# Patient Record
Sex: FEMALE | Race: WHITE | Employment: OTHER | URBAN - NONMETROPOLITAN AREA
[De-identification: names, ages, dates, MRNs, and addresses within clinical notes are randomized per-mention and may not be internally consistent; named-entity substitution may affect disease eponyms.]

---

## 2018-12-05 ENCOUNTER — DOCTOR'S OFFICE (OUTPATIENT)
Dept: URBAN - NONMETROPOLITAN AREA CLINIC 1 | Facility: CLINIC | Age: 83
Setting detail: OPHTHALMOLOGY
End: 2018-12-05
Payer: COMMERCIAL

## 2018-12-05 DIAGNOSIS — H25.043: ICD-10-CM

## 2018-12-05 DIAGNOSIS — H40.1432: ICD-10-CM

## 2018-12-05 DIAGNOSIS — H25.013: ICD-10-CM

## 2018-12-05 DIAGNOSIS — G24.5: ICD-10-CM

## 2018-12-05 DIAGNOSIS — H35.3134: ICD-10-CM

## 2018-12-05 DIAGNOSIS — H25.89: ICD-10-CM

## 2018-12-05 DIAGNOSIS — H02.432: ICD-10-CM

## 2018-12-05 PROCEDURE — 92134 CPTRZ OPH DX IMG PST SGM RTA: CPT | Performed by: OPHTHALMOLOGY

## 2018-12-05 PROCEDURE — 76514 ECHO EXAM OF EYE THICKNESS: CPT | Performed by: OPHTHALMOLOGY

## 2018-12-05 PROCEDURE — 99204 OFFICE O/P NEW MOD 45 MIN: CPT | Performed by: OPHTHALMOLOGY

## 2018-12-05 ASSESSMENT — REFRACTION_MANIFEST
OU_VA: 20/
OD_VA1: 20/
OS_VA1: 20/
OD_VA2: 20/
OD_VA1: 20/
OS_VA2: 20/
OS_VA3: 20/
OD_VA3: 20/
OS_VA3: 20/
OS_VA2: 20/
OS_VA1: 20/
OD_VA3: 20/
OD_VA2: 20/
OU_VA: 20/

## 2018-12-05 ASSESSMENT — REFRACTION_CURRENTRX
OD_CYLINDER: -0.75
OD_AXIS: 128
OS_ADD: +2.75
OS_OVR_VA: 20/
OS_CYLINDER: -1.75
OD_ADD: +2.75
OD_OVR_VA: 20/
OS_OVR_VA: 20/
OS_AXIS: 068
OD_VPRISM_DIRECTION: BF
OD_OVR_VA: 20/
OD_SPHERE: +3.25
OS_SPHERE: +3.25
OS_OVR_VA: 20/
OD_OVR_VA: 20/

## 2018-12-05 ASSESSMENT — VISUAL ACUITY
OD_BCVA: 20/70-1
OS_BCVA: 20/70-2

## 2018-12-05 ASSESSMENT — LID POSITION - PTOSIS: OS_PTOSIS: LUL 1+

## 2018-12-05 ASSESSMENT — CONFRONTATIONAL VISUAL FIELD TEST (CVF)
OD_FINDINGS: FULL
OS_FINDINGS: FULL

## 2019-01-08 ENCOUNTER — DOCTOR'S OFFICE (OUTPATIENT)
Dept: URBAN - NONMETROPOLITAN AREA CLINIC 1 | Facility: CLINIC | Age: 84
Setting detail: OPHTHALMOLOGY
End: 2019-01-08
Payer: COMMERCIAL

## 2019-01-08 DIAGNOSIS — H25.041: ICD-10-CM

## 2019-01-08 PROCEDURE — 92136 OPHTHALMIC BIOMETRY: CPT | Performed by: OPHTHALMOLOGY

## 2019-01-17 ENCOUNTER — AMBUL SURGICAL CARE (OUTPATIENT)
Dept: URBAN - NONMETROPOLITAN AREA SURGERY 1 | Facility: SURGERY | Age: 84
Setting detail: OPHTHALMOLOGY
End: 2019-01-17
Payer: COMMERCIAL

## 2019-01-17 DIAGNOSIS — H25.011: ICD-10-CM

## 2019-01-17 DIAGNOSIS — H25.041: ICD-10-CM

## 2019-01-17 PROCEDURE — G8907 PT DOC NO EVENTS ON DISCHARG: HCPCS | Performed by: OPHTHALMOLOGY

## 2019-01-17 PROCEDURE — G8918 PT W/O PREOP ORDER IV AB PRO: HCPCS | Performed by: OPHTHALMOLOGY

## 2019-01-17 PROCEDURE — 66982 XCAPSL CTRC RMVL CPLX WO ECP: CPT | Performed by: OPHTHALMOLOGY

## 2019-01-18 ENCOUNTER — OPTICAL OFFICE (OUTPATIENT)
Dept: URBAN - NONMETROPOLITAN AREA CLINIC 4 | Facility: CLINIC | Age: 84
Setting detail: OPHTHALMOLOGY
End: 2019-01-18

## 2019-01-18 ENCOUNTER — RX ONLY (RX ONLY)
Age: 84
End: 2019-01-18

## 2019-01-18 ENCOUNTER — DOCTOR'S OFFICE (OUTPATIENT)
Dept: URBAN - NONMETROPOLITAN AREA CLINIC 1 | Facility: CLINIC | Age: 84
Setting detail: OPHTHALMOLOGY
End: 2019-01-18
Payer: COMMERCIAL

## 2019-01-18 DIAGNOSIS — Z96.1: ICD-10-CM

## 2019-01-18 DIAGNOSIS — H25.042: ICD-10-CM

## 2019-01-18 DIAGNOSIS — H52.7: ICD-10-CM

## 2019-01-18 DIAGNOSIS — H25.012: ICD-10-CM

## 2019-01-18 DIAGNOSIS — H25.89: ICD-10-CM

## 2019-01-18 PROCEDURE — 92136 OPHTHALMIC BIOMETRY: CPT | Performed by: OPHTHALMOLOGY

## 2019-01-18 PROCEDURE — 99024 POSTOP FOLLOW-UP VISIT: CPT | Performed by: PHYSICIAN ASSISTANT

## 2019-01-18 PROCEDURE — V2100 LENS SPHER SINGLE PLANO 4.00: HCPCS | Performed by: OPHTHALMOLOGY

## 2019-01-18 ASSESSMENT — LID POSITION - PTOSIS: OS_PTOSIS: LUL 1+

## 2019-01-21 ASSESSMENT — REFRACTION_CURRENTRX
OD_VPRISM_DIRECTION: BF
OS_SPHERE: +3.25
OD_CYLINDER: -0.75
OD_SPHERE: +3.25
OS_OVR_VA: 20/
OS_ADD: +2.75
OD_ADD: +2.75
OS_CYLINDER: -1.75
OD_OVR_VA: 20/
OD_OVR_VA: 20/
OS_OVR_VA: 20/
OS_AXIS: 068
OD_OVR_VA: 20/
OS_OVR_VA: 20/
OD_AXIS: 128

## 2019-01-21 ASSESSMENT — REFRACTION_MANIFEST
OD_VA3: 20/
OD_VA3: 20/
OD_VA2: 20/
OS_VA2: 20/
OS_VA3: 20/
OS_VA1: 20/
OD_VA2: 20/
OU_VA: 20/
OD_VA1: 20/
OS_VA3: 20/
OD_VA1: 20/
OU_VA: 20/
OS_VA2: 20/
OS_VA1: 20/

## 2019-01-21 ASSESSMENT — VISUAL ACUITY
OD_BCVA: 20/70-1
OS_BCVA: 20/70+2

## 2019-01-21 ASSESSMENT — REFRACTION_AUTOREFRACTION
OD_AXIS: 077
OD_SPHERE: +1.75
OD_CYLINDER: -0.75

## 2019-01-21 ASSESSMENT — SPHEQUIV_DERIVED: OD_SPHEQUIV: 1.375

## 2019-01-25 ENCOUNTER — DOCTOR'S OFFICE (OUTPATIENT)
Dept: URBAN - NONMETROPOLITAN AREA CLINIC 1 | Facility: CLINIC | Age: 84
Setting detail: OPHTHALMOLOGY
End: 2019-01-25
Payer: COMMERCIAL

## 2019-01-25 DIAGNOSIS — H25.042: ICD-10-CM

## 2019-01-25 DIAGNOSIS — H25.012: ICD-10-CM

## 2019-01-25 DIAGNOSIS — H25.89: ICD-10-CM

## 2019-01-25 DIAGNOSIS — Z96.1: ICD-10-CM

## 2019-01-25 PROCEDURE — 99024 POSTOP FOLLOW-UP VISIT: CPT | Performed by: OPHTHALMOLOGY

## 2019-01-25 ASSESSMENT — REFRACTION_CURRENTRX
OD_ADD: +2.75
OD_OVR_VA: 20/
OS_SPHERE: +3.25
OS_OVR_VA: 20/
OD_SPHERE: +3.25
OD_VPRISM_DIRECTION: BF
OS_OVR_VA: 20/
OS_AXIS: 068
OS_ADD: +2.75
OD_AXIS: 128
OD_OVR_VA: 20/
OS_OVR_VA: 20/
OS_CYLINDER: -1.75
OD_OVR_VA: 20/
OD_CYLINDER: -0.75

## 2019-01-25 ASSESSMENT — REFRACTION_MANIFEST
OD_VA1: 20/
OU_VA: 20/
OS_VA2: 20/
OS_VA1: 20/
OS_VA1: 20/
OS_VA2: 20/
OU_VA: 20/
OD_VA2: 20/
OD_VA2: 20/
OS_VA3: 20/
OD_VA3: 20/
OD_VA1: 20/
OD_VA3: 20/
OS_VA3: 20/

## 2019-01-25 ASSESSMENT — REFRACTION_AUTOREFRACTION
OD_SPHERE: +1.00
OD_CYLINDER: -1.00
OD_AXIS: 102

## 2019-01-25 ASSESSMENT — SPHEQUIV_DERIVED: OD_SPHEQUIV: 0.5

## 2019-01-25 ASSESSMENT — LID POSITION - PTOSIS: OS_PTOSIS: LUL 1+

## 2019-01-25 ASSESSMENT — VISUAL ACUITY
OS_BCVA: 20/25-2
OD_BCVA: 20/70-1

## 2019-02-07 ENCOUNTER — AMBUL SURGICAL CARE (OUTPATIENT)
Dept: URBAN - NONMETROPOLITAN AREA SURGERY 1 | Facility: SURGERY | Age: 84
Setting detail: OPHTHALMOLOGY
End: 2019-02-07
Payer: COMMERCIAL

## 2019-02-07 DIAGNOSIS — H25.012: ICD-10-CM

## 2019-02-07 DIAGNOSIS — H25.042: ICD-10-CM

## 2019-02-07 PROCEDURE — G8918 PT W/O PREOP ORDER IV AB PRO: HCPCS | Performed by: OPHTHALMOLOGY

## 2019-02-07 PROCEDURE — 66982 XCAPSL CTRC RMVL CPLX WO ECP: CPT | Performed by: OPHTHALMOLOGY

## 2019-02-07 PROCEDURE — G8907 PT DOC NO EVENTS ON DISCHARG: HCPCS | Performed by: OPHTHALMOLOGY

## 2019-02-08 ENCOUNTER — DOCTOR'S OFFICE (OUTPATIENT)
Dept: URBAN - NONMETROPOLITAN AREA CLINIC 1 | Facility: CLINIC | Age: 84
Setting detail: OPHTHALMOLOGY
End: 2019-02-08
Payer: COMMERCIAL

## 2019-02-08 ENCOUNTER — RX ONLY (RX ONLY)
Age: 84
End: 2019-02-08

## 2019-02-08 DIAGNOSIS — Z96.1: ICD-10-CM

## 2019-02-08 PROBLEM — H25.042 CATARACT POST SUBCAPSULAR; LEFT EYE: Status: RESOLVED | Noted: 2019-01-18 | Resolved: 2019-02-08

## 2019-02-08 PROBLEM — H25.012 CATARACT CORTICAL SENILE; LEFT EYE: Status: RESOLVED | Noted: 2019-01-18 | Resolved: 2019-02-08

## 2019-02-08 PROBLEM — H25.89 PSEUDOEXFOLIATION OF LENS CAPSULE ; LEFT EYE: Status: RESOLVED | Noted: 2018-12-05 | Resolved: 2019-02-08

## 2019-02-08 PROCEDURE — 99024 POSTOP FOLLOW-UP VISIT: CPT | Performed by: PHYSICIAN ASSISTANT

## 2019-02-08 ASSESSMENT — LID POSITION - PTOSIS: OS_PTOSIS: LUL 1+

## 2019-02-11 ASSESSMENT — REFRACTION_AUTOREFRACTION
OS_SPHERE: 0.00
OD_CYLINDER: -1.50
OD_SPHERE: +0.75
OS_CYLINDER: -1.00
OS_AXIS: 103
OD_AXIS: 141

## 2019-02-11 ASSESSMENT — VISUAL ACUITY
OD_BCVA: 20/30-2
OS_BCVA: 20/40-2

## 2019-02-11 ASSESSMENT — SPHEQUIV_DERIVED
OD_SPHEQUIV: 0
OS_SPHEQUIV: -0.5

## 2019-02-11 ASSESSMENT — REFRACTION_CURRENTRX
OD_SPHERE: +3.25
OS_OVR_VA: 20/
OD_AXIS: 128
OD_ADD: +2.75
OD_OVR_VA: 20/
OS_OVR_VA: 20/
OS_AXIS: 068
OD_OVR_VA: 20/
OD_OVR_VA: 20/
OS_CYLINDER: -1.75
OD_VPRISM_DIRECTION: BF
OS_SPHERE: +3.25
OD_CYLINDER: -0.75
OS_ADD: +2.75
OS_OVR_VA: 20/

## 2019-02-11 ASSESSMENT — REFRACTION_MANIFEST
OS_VA3: 20/
OS_VA3: 20/
OU_VA: 20/
OS_VA1: 20/
OS_VA2: 20/
OD_VA3: 20/
OU_VA: 20/
OS_VA1: 20/
OD_VA2: 20/
OD_VA3: 20/
OD_VA1: 20/
OD_VA1: 20/
OD_VA2: 20/
OS_VA2: 20/

## 2019-02-27 ENCOUNTER — DOCTOR'S OFFICE (OUTPATIENT)
Dept: URBAN - NONMETROPOLITAN AREA CLINIC 1 | Facility: CLINIC | Age: 84
Setting detail: OPHTHALMOLOGY
End: 2019-02-27
Payer: COMMERCIAL

## 2019-02-27 DIAGNOSIS — Z96.1: ICD-10-CM

## 2019-02-27 PROCEDURE — 99024 POSTOP FOLLOW-UP VISIT: CPT | Performed by: PHYSICIAN ASSISTANT

## 2019-02-27 ASSESSMENT — LID POSITION - PTOSIS: OS_PTOSIS: LUL 1+

## 2019-02-27 ASSESSMENT — CONFRONTATIONAL VISUAL FIELD TEST (CVF)
OD_FINDINGS: FULL
OS_FINDINGS: FULL

## 2019-03-04 ASSESSMENT — REFRACTION_MANIFEST
OD_VA3: 20/
OD_VA1: 20/
OU_VA: 20/
OS_VA3: 20/
OS_VA2: 20/
OS_VA1: 20/
OD_VA1: 20/
OU_VA: 20/
OS_VA3: 20/
OS_VA2: 20/
OD_VA3: 20/
OD_VA2: 20/
OS_VA1: 20/
OD_VA2: 20/

## 2019-03-04 ASSESSMENT — VISUAL ACUITY
OD_BCVA: 20/30-2
OS_BCVA: 20/30-1

## 2019-03-04 ASSESSMENT — SPHEQUIV_DERIVED
OD_SPHEQUIV: 0
OS_SPHEQUIV: -0.5

## 2019-03-04 ASSESSMENT — REFRACTION_CURRENTRX
OD_OVR_VA: 20/
OS_OVR_VA: 20/
OD_VPRISM_DIRECTION: BF
OD_OVR_VA: 20/
OD_OVR_VA: 20/
OS_CYLINDER: -1.75
OS_ADD: +2.75
OS_SPHERE: +3.25
OD_SPHERE: +3.25
OS_AXIS: 068
OD_AXIS: 128
OS_OVR_VA: 20/
OD_CYLINDER: -0.75
OS_OVR_VA: 20/
OD_ADD: +2.75

## 2019-03-04 ASSESSMENT — REFRACTION_AUTOREFRACTION
OS_AXIS: 103
OS_CYLINDER: -1.00
OD_CYLINDER: -1.50
OD_AXIS: 141
OS_SPHERE: 0.00
OD_SPHERE: +0.75

## 2019-03-26 ENCOUNTER — DOCTOR'S OFFICE (OUTPATIENT)
Dept: URBAN - NONMETROPOLITAN AREA CLINIC 1 | Facility: CLINIC | Age: 84
Setting detail: OPHTHALMOLOGY
End: 2019-03-26
Payer: COMMERCIAL

## 2019-03-26 ENCOUNTER — OPTICAL OFFICE (OUTPATIENT)
Dept: URBAN - NONMETROPOLITAN AREA CLINIC 4 | Facility: CLINIC | Age: 84
Setting detail: OPHTHALMOLOGY
End: 2019-03-26
Payer: COMMERCIAL

## 2019-03-26 DIAGNOSIS — Z96.1: ICD-10-CM

## 2019-03-26 DIAGNOSIS — H52.4: ICD-10-CM

## 2019-03-26 PROCEDURE — V2020 VISION SVCS FRAMES PURCHASES: HCPCS | Performed by: OPTOMETRIST

## 2019-03-26 PROCEDURE — V2204 LENS SPHCY BIFOCAL 4.00D/2.1: HCPCS | Performed by: OPTOMETRIST

## 2019-03-26 PROCEDURE — 92015 DETERMINE REFRACTIVE STATE: CPT | Performed by: OPTOMETRIST

## 2019-03-26 PROCEDURE — V2203 LENS SPHCYL BIFOCAL 4.00D/.1: HCPCS | Performed by: OPTOMETRIST

## 2019-03-26 ASSESSMENT — REFRACTION_MANIFEST
OD_VA2: 20/25-2
OS_VA1: 20/40
OD_VA3: 20/
OD_CYLINDER: -2.50
OD_SPHERE: +1.50
OS_VA2: 20/40
OS_VA3: 20/
OS_ADD: +2.75
OD_AXIS: 085
OS_VA1: 20/
OD_VA1: 20/25-2
OD_VA1: 20/
OS_CYLINDER: -0.50
OS_SPHERE: +0.25
OD_VA2: 20/
OU_VA: 20/
OD_ADD: +2.75
OD_VA3: 20/
OU_VA: 20/
OS_VA2: 20/
OS_AXIS: 020
OS_VA3: 20/

## 2019-03-26 ASSESSMENT — REFRACTION_CURRENTRX
OS_ADD: +2.75
OD_OVR_VA: 20/
OD_VPRISM_DIRECTION: BF
OD_OVR_VA: 20/
OD_AXIS: 128
OS_AXIS: 068
OD_ADD: +2.75
OS_OVR_VA: 20/
OS_OVR_VA: 20/
OD_CYLINDER: -0.75
OD_OVR_VA: 20/
OS_OVR_VA: 20/
OS_SPHERE: +3.25
OD_SPHERE: +3.25
OS_CYLINDER: -1.75

## 2019-03-26 ASSESSMENT — REFRACTION_AUTOREFRACTION
OS_CYLINDER: -0.50
OS_SPHERE: 0.00
OD_SPHERE: +2.25
OD_AXIS: 083
OD_CYLINDER: -2.75
OS_AXIS: 020

## 2019-03-26 ASSESSMENT — SPHEQUIV_DERIVED
OD_SPHEQUIV: 0.875
OD_SPHEQUIV: 0.25
OS_SPHEQUIV: 0
OS_SPHEQUIV: -0.25

## 2019-03-26 ASSESSMENT — VISUAL ACUITY
OD_BCVA: 20/40
OS_BCVA: 20/50-2

## 2019-04-02 ENCOUNTER — DOCTOR'S OFFICE (OUTPATIENT)
Dept: URBAN - NONMETROPOLITAN AREA CLINIC 1 | Facility: CLINIC | Age: 84
Setting detail: OPHTHALMOLOGY
End: 2019-04-02
Payer: COMMERCIAL

## 2019-04-02 DIAGNOSIS — H02.432: ICD-10-CM

## 2019-04-02 DIAGNOSIS — Z96.1: ICD-10-CM

## 2019-04-02 DIAGNOSIS — H40.1432: ICD-10-CM

## 2019-04-02 DIAGNOSIS — G24.5: ICD-10-CM

## 2019-04-02 DIAGNOSIS — H35.3134: ICD-10-CM

## 2019-04-02 PROCEDURE — 99024 POSTOP FOLLOW-UP VISIT: CPT | Performed by: OPHTHALMOLOGY

## 2019-04-02 PROCEDURE — 92134 CPTRZ OPH DX IMG PST SGM RTA: CPT | Performed by: OPHTHALMOLOGY

## 2019-04-02 ASSESSMENT — REFRACTION_MANIFEST
OD_SPHERE: +1.50
OD_VA1: 20/
OD_VA3: 20/
OD_VA3: 20/
OS_VA1: 20/
OS_VA3: 20/
OD_CYLINDER: -2.50
OD_VA2: 20/
OS_CYLINDER: -0.50
OD_ADD: +2.75
OS_VA2: 20/
OU_VA: 20/
OD_VA1: 20/25-2
OD_VA2: 20/25-2
OS_VA3: 20/
OS_VA1: 20/40
OS_SPHERE: +0.25
OS_VA2: 20/40
OS_ADD: +2.75
OU_VA: 20/
OS_AXIS: 020
OD_AXIS: 085

## 2019-04-02 ASSESSMENT — SPHEQUIV_DERIVED
OS_SPHEQUIV: 0
OD_SPHEQUIV: 0.5
OD_SPHEQUIV: 0.25
OS_SPHEQUIV: 0.625

## 2019-04-02 ASSESSMENT — CONFRONTATIONAL VISUAL FIELD TEST (CVF)
OS_FINDINGS: FULL
OD_FINDINGS: FULL

## 2019-04-02 ASSESSMENT — VISUAL ACUITY
OS_BCVA: 20/30
OD_BCVA: 20/30

## 2019-04-02 ASSESSMENT — REFRACTION_CURRENTRX
OS_ADD: +2.75
OD_OVR_VA: 20/
OS_OVR_VA: 20/
OS_SPHERE: +3.25
OD_OVR_VA: 20/
OD_SPHERE: +3.25
OD_CYLINDER: -0.75
OD_VPRISM_DIRECTION: BF
OS_VPRISM_DIRECTION: BF
OS_AXIS: 068
OS_OVR_VA: 20/
OS_OVR_VA: 20/
OD_ADD: +2.75
OS_CYLINDER: -1.75
OD_AXIS: 128
OD_OVR_VA: 20/

## 2019-04-02 ASSESSMENT — REFRACTION_AUTOREFRACTION
OS_CYLINDER: -0.25
OS_SPHERE: +0.75
OD_CYLINDER: -1.00
OS_AXIS: 130
OD_SPHERE: +1.00
OD_AXIS: 089

## 2019-04-02 ASSESSMENT — LID POSITION - PTOSIS: OS_PTOSIS: LUL 1+

## 2022-06-29 ENCOUNTER — RX ONLY (RX ONLY)
Age: 87
End: 2022-06-29

## 2022-06-29 ENCOUNTER — DOCTOR'S OFFICE (OUTPATIENT)
Dept: URBAN - NONMETROPOLITAN AREA CLINIC 1 | Facility: CLINIC | Age: 87
Setting detail: OPHTHALMOLOGY
End: 2022-06-29
Payer: COMMERCIAL

## 2022-06-29 DIAGNOSIS — Z96.1: ICD-10-CM

## 2022-06-29 DIAGNOSIS — H35.3134: ICD-10-CM

## 2022-06-29 DIAGNOSIS — H40.1432: ICD-10-CM

## 2022-06-29 DIAGNOSIS — G24.5: ICD-10-CM

## 2022-06-29 DIAGNOSIS — H02.432: ICD-10-CM

## 2022-06-29 PROCEDURE — 92134 CPTRZ OPH DX IMG PST SGM RTA: CPT | Performed by: OPHTHALMOLOGY

## 2022-06-29 PROCEDURE — 92083 EXTENDED VISUAL FIELD XM: CPT | Performed by: OPHTHALMOLOGY

## 2022-06-29 PROCEDURE — 99204 OFFICE O/P NEW MOD 45 MIN: CPT | Performed by: OPHTHALMOLOGY

## 2022-06-29 PROCEDURE — 76514 ECHO EXAM OF EYE THICKNESS: CPT | Performed by: OPHTHALMOLOGY

## 2022-06-29 ASSESSMENT — CONFRONTATIONAL VISUAL FIELD TEST (CVF)
OD_FINDINGS: FULL
OS_FINDINGS: FULL

## 2022-06-29 ASSESSMENT — REFRACTION_AUTOREFRACTION
OD_CYLINDER: -1.00
OS_CYLINDER: -0.25
OS_AXIS: 130
OD_SPHERE: +1.00
OS_SPHERE: +0.75
OD_AXIS: 089

## 2022-06-29 ASSESSMENT — REFRACTION_MANIFEST
OD_VA1: 20/25-2
OS_ADD: +2.75
OD_VA2: 20/25-2
OS_AXIS: 020
OS_SPHERE: +0.25
OD_CYLINDER: -2.50
OS_CYLINDER: -0.50
OS_VA2: 20/40
OD_ADD: +2.75
OD_SPHERE: +1.50
OD_AXIS: 085
OS_VA1: 20/40

## 2022-06-29 ASSESSMENT — REFRACTION_CURRENTRX
OS_OVR_VA: 20/
OD_SPHERE: +1.50
OS_AXIS: 020
OS_ADD: +2.75
OS_VPRISM_DIRECTION: BF
OS_CYLINDER: -0.50
OD_OVR_VA: 20/
OD_ADD: +2.75
OS_SPHERE: +0.25
OD_CYLINDER: -2.50
OD_VPRISM_DIRECTION: BF
OD_AXIS: 085

## 2022-06-29 ASSESSMENT — VISUAL ACUITY
OD_BCVA: 20/60-2
OS_BCVA: 20/30+1

## 2022-06-29 ASSESSMENT — SPHEQUIV_DERIVED
OD_SPHEQUIV: 0.25
OD_SPHEQUIV: 0.5
OS_SPHEQUIV: 0
OS_SPHEQUIV: 0.625

## 2022-06-29 ASSESSMENT — TONOMETRY
OD_IOP_MMHG: 14
OS_IOP_MMHG: 16

## 2022-06-29 ASSESSMENT — PACHYMETRY
OS_CT_UM: 624
OS_CT_CORRECTION: -6
OD_CT_CORRECTION: -5
OD_CT_UM: 614

## 2022-06-29 ASSESSMENT — LID POSITION - PTOSIS: OS_PTOSIS: LUL 1+

## 2022-09-28 ENCOUNTER — DOCTOR'S OFFICE (OUTPATIENT)
Dept: URBAN - NONMETROPOLITAN AREA CLINIC 1 | Facility: CLINIC | Age: 87
Setting detail: OPHTHALMOLOGY
End: 2022-09-28
Payer: COMMERCIAL

## 2022-09-28 DIAGNOSIS — H40.1432: ICD-10-CM

## 2022-09-28 DIAGNOSIS — H35.3134: ICD-10-CM

## 2022-09-28 DIAGNOSIS — G24.5: ICD-10-CM

## 2022-09-28 DIAGNOSIS — H02.432: ICD-10-CM

## 2022-09-28 DIAGNOSIS — Z96.1: ICD-10-CM

## 2022-09-28 PROCEDURE — 92134 CPTRZ OPH DX IMG PST SGM RTA: CPT | Performed by: OPHTHALMOLOGY

## 2022-09-28 PROCEDURE — 92014 COMPRE OPH EXAM EST PT 1/>: CPT | Performed by: OPHTHALMOLOGY

## 2022-09-28 ASSESSMENT — LID POSITION - PTOSIS: OS_PTOSIS: LUL 1+

## 2022-09-28 ASSESSMENT — REFRACTION_MANIFEST
OS_VA2: 20/40
OD_ADD: +2.75
OS_ADD: +2.75
OD_CYLINDER: -2.50
OS_SPHERE: +0.25
OD_VA1: 20/25-2
OD_VA2: 20/25-2
OD_SPHERE: +1.50
OS_AXIS: 020
OD_AXIS: 085
OS_VA1: 20/40
OS_CYLINDER: -0.50

## 2022-09-28 ASSESSMENT — REFRACTION_CURRENTRX
OD_AXIS: 085
OD_SPHERE: +1.50
OD_ADD: +2.75
OD_CYLINDER: -2.50
OS_CYLINDER: -0.50
OS_SPHERE: +0.25
OS_VPRISM_DIRECTION: BF
OS_AXIS: 020
OD_VPRISM_DIRECTION: BF
OS_ADD: +2.75
OD_OVR_VA: 20/
OS_OVR_VA: 20/

## 2022-09-28 ASSESSMENT — REFRACTION_AUTOREFRACTION
OD_CYLINDER: -1.00
OS_AXIS: 130
OD_SPHERE: +1.00
OD_AXIS: 089
OS_CYLINDER: -0.25
OS_SPHERE: +0.75

## 2022-09-28 ASSESSMENT — CONFRONTATIONAL VISUAL FIELD TEST (CVF)
OD_FINDINGS: FULL
OS_FINDINGS: FULL

## 2022-09-28 ASSESSMENT — VISUAL ACUITY
OD_BCVA: 20/70
OS_BCVA: 20/30-2

## 2022-09-28 ASSESSMENT — SPHEQUIV_DERIVED
OS_SPHEQUIV: 0.625
OD_SPHEQUIV: 0.25
OD_SPHEQUIV: 0.5
OS_SPHEQUIV: 0

## 2023-07-26 ENCOUNTER — DOCTOR'S OFFICE (OUTPATIENT)
Dept: URBAN - NONMETROPOLITAN AREA CLINIC 1 | Facility: CLINIC | Age: 88
Setting detail: OPHTHALMOLOGY
End: 2023-07-26
Payer: COMMERCIAL

## 2023-07-26 ENCOUNTER — RX ONLY (RX ONLY)
Age: 88
End: 2023-07-26

## 2023-07-26 DIAGNOSIS — H40.1432: ICD-10-CM

## 2023-07-26 DIAGNOSIS — H02.432: ICD-10-CM

## 2023-07-26 DIAGNOSIS — Z96.1: ICD-10-CM

## 2023-07-26 DIAGNOSIS — H35.3132: ICD-10-CM

## 2023-07-26 PROCEDURE — 92014 COMPRE OPH EXAM EST PT 1/>: CPT | Performed by: OPHTHALMOLOGY

## 2023-07-26 PROCEDURE — 92134 CPTRZ OPH DX IMG PST SGM RTA: CPT | Performed by: OPHTHALMOLOGY

## 2023-07-26 PROCEDURE — 76514 ECHO EXAM OF EYE THICKNESS: CPT | Performed by: OPHTHALMOLOGY

## 2023-07-26 RX ORDER — BROMFENAC SODIUM 0.7 MG/ML
SOLUTION/ DROPS OPHTHALMIC
Qty: 3 | Refills: 3 | Status: ACTIVE | OUTPATIENT

## 2023-07-26 ASSESSMENT — REFRACTION_CURRENTRX
OD_AXIS: 085
OS_CYLINDER: -0.50
OS_AXIS: 020
OD_CYLINDER: -2.50
OD_VPRISM_DIRECTION: BF
OS_ADD: +2.75
OD_SPHERE: +1.50
OS_SPHERE: +0.25
OS_VPRISM_DIRECTION: BF
OD_OVR_VA: 20/
OD_ADD: +2.75
OS_OVR_VA: 20/

## 2023-07-26 ASSESSMENT — REFRACTION_MANIFEST
OS_ADD: +2.75
OS_CYLINDER: -0.50
OD_CYLINDER: -2.50
OD_ADD: +2.75
OS_SPHERE: +0.25
OS_VA1: 20/40
OD_AXIS: 085
OD_SPHERE: +1.50
OD_VA2: 20/25-2
OS_AXIS: 020
OD_VA1: 20/25-2
OS_VA2: 20/40

## 2023-07-26 ASSESSMENT — SPHEQUIV_DERIVED
OS_SPHEQUIV: 0
OD_SPHEQUIV: 0.25
OS_SPHEQUIV: 0
OD_SPHEQUIV: 1.25

## 2023-07-26 ASSESSMENT — REFRACTION_AUTOREFRACTION
OD_SPHERE: +1.50
OD_AXIS: 167
OS_SPHERE: +0.75
OD_CYLINDER: -0.50
OS_CYLINDER: -1.50
OS_AXIS: 077

## 2023-07-26 ASSESSMENT — TONOMETRY
OS_IOP_MMHG: 16
OD_IOP_MMHG: 15

## 2023-07-26 ASSESSMENT — VISUAL ACUITY
OD_BCVA: 20/50
OS_BCVA: 20/25

## 2023-07-26 ASSESSMENT — LID POSITION - PTOSIS: OS_PTOSIS: LUL 1+

## 2023-07-26 ASSESSMENT — PACHYMETRY
OD_CT_UM: 600
OS_CT_CORRECTION: -4
OS_CT_UM: 591
OD_CT_CORRECTION: -4

## 2023-07-26 ASSESSMENT — CONFRONTATIONAL VISUAL FIELD TEST (CVF)
OS_FINDINGS: FULL
OD_FINDINGS: FULL

## 2023-08-01 ENCOUNTER — RX ONLY (RX ONLY)
Age: 88
End: 2023-08-01

## 2023-08-01 RX ORDER — BROMFENAC 0.9 MG/ML
SOLUTION/ DROPS OPHTHALMIC
Qty: 5 | Refills: 3 | Status: ACTIVE | OUTPATIENT

## 2023-08-21 ENCOUNTER — DOCTOR'S OFFICE (OUTPATIENT)
Dept: URBAN - NONMETROPOLITAN AREA CLINIC 1 | Facility: CLINIC | Age: 88
Setting detail: OPHTHALMOLOGY
End: 2023-08-21
Payer: COMMERCIAL

## 2023-08-21 ENCOUNTER — RX ONLY (RX ONLY)
Age: 88
End: 2023-08-21

## 2023-08-21 DIAGNOSIS — H02.432: ICD-10-CM

## 2023-08-21 DIAGNOSIS — Z96.1: ICD-10-CM

## 2023-08-21 DIAGNOSIS — H35.3132: ICD-10-CM

## 2023-08-21 DIAGNOSIS — H40.1432: ICD-10-CM

## 2023-08-21 PROCEDURE — 99214 OFFICE O/P EST MOD 30 MIN: CPT | Performed by: OPHTHALMOLOGY

## 2023-08-21 ASSESSMENT — REFRACTION_CURRENTRX
OD_SPHERE: +1.50
OD_CYLINDER: -2.50
OD_OVR_VA: 20/
OS_SPHERE: +0.25
OD_VPRISM_DIRECTION: BF
OS_OVR_VA: 20/
OS_ADD: +2.75
OD_AXIS: 085
OS_AXIS: 020
OD_ADD: +2.75
OS_VPRISM_DIRECTION: BF
OS_CYLINDER: -0.50

## 2023-08-21 ASSESSMENT — REFRACTION_AUTOREFRACTION
OD_CYLINDER: -0.50
OS_SPHERE: +0.75
OS_CYLINDER: -1.50
OD_AXIS: 167
OD_SPHERE: +1.50
OS_AXIS: 077

## 2023-08-21 ASSESSMENT — SPHEQUIV_DERIVED
OD_SPHEQUIV: 0.25
OS_SPHEQUIV: 0
OS_SPHEQUIV: 0
OD_SPHEQUIV: 1.25

## 2023-08-21 ASSESSMENT — REFRACTION_MANIFEST
OD_VA2: 20/25-2
OS_ADD: +2.75
OD_VA1: 20/25-2
OS_AXIS: 020
OD_AXIS: 085
OS_VA2: 20/40
OD_SPHERE: +1.50
OS_CYLINDER: -0.50
OS_SPHERE: +0.25
OD_ADD: +2.75
OS_VA1: 20/40
OD_CYLINDER: -2.50

## 2023-08-21 ASSESSMENT — CONFRONTATIONAL VISUAL FIELD TEST (CVF)
OD_FINDINGS: FULL
OS_FINDINGS: FULL

## 2023-08-21 ASSESSMENT — LID POSITION - PTOSIS: OS_PTOSIS: LUL 1+

## 2023-08-21 ASSESSMENT — VISUAL ACUITY
OD_BCVA: 20/60-2
OS_BCVA: 20/30-2

## 2023-09-14 ENCOUNTER — DOCTOR'S OFFICE (OUTPATIENT)
Dept: URBAN - NONMETROPOLITAN AREA CLINIC 1 | Facility: CLINIC | Age: 88
Setting detail: OPHTHALMOLOGY
End: 2023-09-14
Payer: COMMERCIAL

## 2023-09-14 DIAGNOSIS — H02.432: ICD-10-CM

## 2023-09-14 DIAGNOSIS — H35.3132: ICD-10-CM

## 2023-09-14 DIAGNOSIS — H40.1432: ICD-10-CM

## 2023-09-14 DIAGNOSIS — H35.363: ICD-10-CM

## 2023-09-14 PROCEDURE — 99213 OFFICE O/P EST LOW 20 MIN: CPT | Performed by: OPHTHALMOLOGY

## 2023-09-14 PROCEDURE — 92235 FLUORESCEIN ANGRPH MLTIFRAME: CPT | Performed by: OPHTHALMOLOGY

## 2023-09-14 PROCEDURE — 92250 FUNDUS PHOTOGRAPHY W/I&R: CPT | Performed by: OPHTHALMOLOGY

## 2023-09-14 ASSESSMENT — CONFRONTATIONAL VISUAL FIELD TEST (CVF)
OD_FINDINGS: FULL
OS_FINDINGS: FULL

## 2023-09-14 ASSESSMENT — LID POSITION - PTOSIS: OS_PTOSIS: LUL 1+

## 2023-09-15 ASSESSMENT — REFRACTION_CURRENTRX
OD_SPHERE: +1.50
OS_CYLINDER: -0.50
OD_VPRISM_DIRECTION: BF
OD_AXIS: 085
OS_VPRISM_DIRECTION: BF
OS_SPHERE: +0.25
OD_OVR_VA: 20/
OS_AXIS: 020
OD_ADD: +2.75
OS_OVR_VA: 20/
OS_ADD: +2.75
OD_CYLINDER: -2.50

## 2023-09-15 ASSESSMENT — SPHEQUIV_DERIVED
OS_SPHEQUIV: 0
OD_SPHEQUIV: 1.25
OS_SPHEQUIV: 0
OD_SPHEQUIV: 0.25

## 2023-09-15 ASSESSMENT — VISUAL ACUITY
OD_BCVA: 20/50-2
OS_BCVA: 20/40-1

## 2023-09-15 ASSESSMENT — REFRACTION_MANIFEST
OS_ADD: +2.75
OD_VA2: 20/25-2
OS_AXIS: 020
OD_CYLINDER: -2.50
OD_VA1: 20/25-2
OD_ADD: +2.75
OD_AXIS: 085
OS_CYLINDER: -0.50
OS_VA1: 20/40
OS_VA2: 20/40
OS_SPHERE: +0.25
OD_SPHERE: +1.50

## 2023-09-15 ASSESSMENT — REFRACTION_AUTOREFRACTION
OS_AXIS: 077
OD_SPHERE: +1.50
OS_CYLINDER: -1.50
OD_CYLINDER: -0.50
OS_SPHERE: +0.75
OD_AXIS: 167

## 2023-09-21 ENCOUNTER — OPTICAL OFFICE (OUTPATIENT)
Dept: URBAN - NONMETROPOLITAN AREA CLINIC 4 | Facility: CLINIC | Age: 88
Setting detail: OPHTHALMOLOGY
End: 2023-09-21
Payer: COMMERCIAL

## 2023-09-21 ENCOUNTER — DOCTOR'S OFFICE (OUTPATIENT)
Dept: URBAN - NONMETROPOLITAN AREA CLINIC 1 | Facility: CLINIC | Age: 88
Setting detail: OPHTHALMOLOGY
End: 2023-09-21
Payer: COMMERCIAL

## 2023-09-21 ENCOUNTER — RX ONLY (RX ONLY)
Age: 88
End: 2023-09-21

## 2023-09-21 DIAGNOSIS — H52.01: ICD-10-CM

## 2023-09-21 DIAGNOSIS — H52.4: ICD-10-CM

## 2023-09-21 DIAGNOSIS — H52.223: ICD-10-CM

## 2023-09-21 PROBLEM — H35.363 DRUSEN; BOTH EYES: Status: ACTIVE | Noted: 2023-09-14

## 2023-09-21 PROCEDURE — V2107 SPHEROCYLINDER 4.25D/12-2D: HCPCS

## 2023-09-21 PROCEDURE — 92012 INTRM OPH EXAM EST PATIENT: CPT

## 2023-09-21 PROCEDURE — V2784 LENS POLYCARB OR EQUAL: HCPCS

## 2023-09-21 PROCEDURE — V2103 SPHEROCYLINDR 4.00D/12-2.00D: HCPCS

## 2023-09-21 PROCEDURE — 92015 DETERMINE REFRACTIVE STATE: CPT

## 2023-09-21 PROCEDURE — V2020 VISION SVCS FRAMES PURCHASES: HCPCS

## 2023-09-21 ASSESSMENT — CONFRONTATIONAL VISUAL FIELD TEST (CVF)
OS_FINDINGS: FULL
OD_FINDINGS: FULL

## 2023-09-21 ASSESSMENT — REFRACTION_MANIFEST
OS_SPHERE: PL
OS_ADD: +3.00
OD_AXIS: 090
OD_VA2: 20/20
OU_VA: 20/25
OD_CYLINDER: -1.00
OS_VA2: 20/30
OS_CYLINDER: -0.25
OD_VA1: 20/25
OS_AXIS: 035
OD_SPHERE: +1.50
OS_VA1: 20/40
OD_ADD: +3.00

## 2023-09-21 ASSESSMENT — REFRACTION_AUTOREFRACTION
OS_AXIS: 077
OD_CYLINDER: -0.50
OD_SPHERE: +1.50
OS_CYLINDER: -1.50
OD_AXIS: 167
OS_SPHERE: +0.75

## 2023-09-21 ASSESSMENT — REFRACTION_CURRENTRX
OS_SPHERE: PLANO
OD_AXIS: 088
OS_CYLINDER: -0.50
OS_VPRISM_DIRECTION: BF
OD_VPRISM_DIRECTION: BF
OS_ADD: +3.00
OD_OVR_VA: 20/
OS_AXIS: 035
OD_SPHERE: +1.50
OD_CYLINDER: -2.50
OD_ADD: +3.00
OS_OVR_VA: 20/

## 2023-09-21 ASSESSMENT — VISUAL ACUITY
OD_BCVA: 20/60-2
OS_BCVA: 20/40-1

## 2023-09-21 ASSESSMENT — SPHEQUIV_DERIVED
OS_SPHEQUIV: 0
OD_SPHEQUIV: 1
OD_SPHEQUIV: 1.25

## 2023-09-21 ASSESSMENT — LID POSITION - PTOSIS: OS_PTOSIS: LUL 1+

## 2024-05-20 ENCOUNTER — APPOINTMENT (EMERGENCY)
Dept: CT IMAGING | Facility: HOSPITAL | Age: 89
DRG: 175 | End: 2024-05-20
Payer: COMMERCIAL

## 2024-05-20 ENCOUNTER — APPOINTMENT (OUTPATIENT)
Dept: RADIOLOGY | Facility: CLINIC | Age: 89
End: 2024-05-20
Payer: COMMERCIAL

## 2024-05-20 ENCOUNTER — HOSPITAL ENCOUNTER (INPATIENT)
Facility: HOSPITAL | Age: 89
LOS: 3 days | Discharge: NURSING, REHAB OR LONG-TERM CARE | DRG: 175 | End: 2024-05-23
Attending: EMERGENCY MEDICINE | Admitting: FAMILY MEDICINE
Payer: COMMERCIAL

## 2024-05-20 ENCOUNTER — OFFICE VISIT (OUTPATIENT)
Dept: URGENT CARE | Facility: CLINIC | Age: 89
End: 2024-05-20
Payer: COMMERCIAL

## 2024-05-20 VITALS
WEIGHT: 97 LBS | SYSTOLIC BLOOD PRESSURE: 104 MMHG | RESPIRATION RATE: 44 BRPM | OXYGEN SATURATION: 94 % | DIASTOLIC BLOOD PRESSURE: 68 MMHG | HEART RATE: 91 BPM | TEMPERATURE: 97.6 F

## 2024-05-20 DIAGNOSIS — I26.94 MULTIPLE SUBSEGMENTAL PULMONARY EMBOLI WITHOUT ACUTE COR PULMONALE (HCC): ICD-10-CM

## 2024-05-20 DIAGNOSIS — R06.02 SHORTNESS OF BREATH: Primary | ICD-10-CM

## 2024-05-20 DIAGNOSIS — R09.02 HYPOXIA: Primary | ICD-10-CM

## 2024-05-20 DIAGNOSIS — J96.01 ACUTE RESPIRATORY FAILURE WITH HYPOXIA (HCC): ICD-10-CM

## 2024-05-20 PROBLEM — I26.99 ACUTE PULMONARY EMBOLISM (HCC): Status: ACTIVE | Noted: 2024-05-20

## 2024-05-20 PROBLEM — J96.00 ACUTE RESPIRATORY FAILURE (HCC): Status: ACTIVE | Noted: 2024-05-20

## 2024-05-20 PROBLEM — R13.10 DYSPHAGIA: Status: ACTIVE | Noted: 2024-05-20

## 2024-05-20 LAB
2HR DELTA HS TROPONIN: 4 NG/L
4HR DELTA HS TROPONIN: 3 NG/L
ALBUMIN SERPL BCP-MCNC: 3.1 G/DL (ref 3.5–5)
ALP SERPL-CCNC: 57 U/L (ref 34–104)
ALT SERPL W P-5'-P-CCNC: 10 U/L (ref 7–52)
ANION GAP SERPL CALCULATED.3IONS-SCNC: 5 MMOL/L (ref 4–13)
APTT PPP: 26 SECONDS (ref 23–37)
AST SERPL W P-5'-P-CCNC: 30 U/L (ref 13–39)
ATRIAL RATE: 98 BPM
BASOPHILS # BLD AUTO: 0.09 THOUSANDS/ÂΜL (ref 0–0.1)
BASOPHILS NFR BLD AUTO: 1 % (ref 0–1)
BILIRUB SERPL-MCNC: 0.75 MG/DL (ref 0.2–1)
BNP SERPL-MCNC: 336 PG/ML (ref 0–100)
BUN SERPL-MCNC: 15 MG/DL (ref 5–25)
CALCIUM ALBUM COR SERPL-MCNC: 9.2 MG/DL (ref 8.3–10.1)
CALCIUM SERPL-MCNC: 8.5 MG/DL (ref 8.4–10.2)
CARDIAC TROPONIN I PNL SERPL HS: 10 NG/L
CARDIAC TROPONIN I PNL SERPL HS: 13 NG/L
CARDIAC TROPONIN I PNL SERPL HS: 14 NG/L
CHLORIDE SERPL-SCNC: 106 MMOL/L (ref 96–108)
CO2 SERPL-SCNC: 25 MMOL/L (ref 21–32)
CREAT SERPL-MCNC: 0.7 MG/DL (ref 0.6–1.3)
D DIMER PPP FEU-MCNC: 2.54 UG/ML FEU
EOSINOPHIL # BLD AUTO: 0.57 THOUSAND/ÂΜL (ref 0–0.61)
EOSINOPHIL NFR BLD AUTO: 8 % (ref 0–6)
ERYTHROCYTE [DISTWIDTH] IN BLOOD BY AUTOMATED COUNT: 14.1 % (ref 11.6–15.1)
FLUAV RNA RESP QL NAA+PROBE: NEGATIVE
FLUBV RNA RESP QL NAA+PROBE: NEGATIVE
GFR SERPL CREATININE-BSD FRML MDRD: 73 ML/MIN/1.73SQ M
GLUCOSE SERPL-MCNC: 91 MG/DL (ref 65–140)
HCT VFR BLD AUTO: 40.4 % (ref 34.8–46.1)
HGB BLD-MCNC: 12.8 G/DL (ref 11.5–15.4)
IMM GRANULOCYTES # BLD AUTO: 0.05 THOUSAND/UL (ref 0–0.2)
IMM GRANULOCYTES NFR BLD AUTO: 1 % (ref 0–2)
INR PPP: 1.07 (ref 0.84–1.19)
LACTATE SERPL-SCNC: 1.2 MMOL/L (ref 0.5–2)
LYMPHOCYTES # BLD AUTO: 1.41 THOUSANDS/ÂΜL (ref 0.6–4.47)
LYMPHOCYTES NFR BLD AUTO: 19 % (ref 14–44)
MAGNESIUM SERPL-MCNC: 2.5 MG/DL (ref 1.9–2.7)
MCH RBC QN AUTO: 29.2 PG (ref 26.8–34.3)
MCHC RBC AUTO-ENTMCNC: 31.7 G/DL (ref 31.4–37.4)
MCV RBC AUTO: 92 FL (ref 82–98)
MONOCYTES # BLD AUTO: 0.71 THOUSAND/ÂΜL (ref 0.17–1.22)
MONOCYTES NFR BLD AUTO: 10 % (ref 4–12)
NEUTROPHILS # BLD AUTO: 4.68 THOUSANDS/ÂΜL (ref 1.85–7.62)
NEUTS SEG NFR BLD AUTO: 61 % (ref 43–75)
NRBC BLD AUTO-RTO: 0 /100 WBCS
P AXIS: 89 DEGREES
PLATELET # BLD AUTO: 305 THOUSANDS/UL (ref 149–390)
PMV BLD AUTO: 9.4 FL (ref 8.9–12.7)
POTASSIUM SERPL-SCNC: 5 MMOL/L (ref 3.5–5.3)
PR INTERVAL: 126 MS
PROT SERPL-MCNC: 6.9 G/DL (ref 6.4–8.4)
PROTHROMBIN TIME: 14.2 SECONDS (ref 11.6–14.5)
QRS AXIS: -3 DEGREES
QRSD INTERVAL: 92 MS
QT INTERVAL: 378 MS
QTC INTERVAL: 482 MS
RBC # BLD AUTO: 4.38 MILLION/UL (ref 3.81–5.12)
RSV RNA RESP QL NAA+PROBE: NEGATIVE
SARS-COV-2 RNA RESP QL NAA+PROBE: NEGATIVE
SODIUM SERPL-SCNC: 136 MMOL/L (ref 135–147)
T WAVE AXIS: 67 DEGREES
TSH SERPL DL<=0.05 MIU/L-ACNC: 2.91 UIU/ML (ref 0.45–4.5)
VENTRICULAR RATE: 98 BPM
WBC # BLD AUTO: 7.51 THOUSAND/UL (ref 4.31–10.16)

## 2024-05-20 PROCEDURE — 83735 ASSAY OF MAGNESIUM: CPT | Performed by: EMERGENCY MEDICINE

## 2024-05-20 PROCEDURE — 85730 THROMBOPLASTIN TIME PARTIAL: CPT

## 2024-05-20 PROCEDURE — S9088 SERVICES PROVIDED IN URGENT: HCPCS

## 2024-05-20 PROCEDURE — 99223 1ST HOSP IP/OBS HIGH 75: CPT | Performed by: FAMILY MEDICINE

## 2024-05-20 PROCEDURE — 99285 EMERGENCY DEPT VISIT HI MDM: CPT | Performed by: EMERGENCY MEDICINE

## 2024-05-20 PROCEDURE — 93010 ELECTROCARDIOGRAM REPORT: CPT | Performed by: INTERNAL MEDICINE

## 2024-05-20 PROCEDURE — 71275 CT ANGIOGRAPHY CHEST: CPT

## 2024-05-20 PROCEDURE — 99203 OFFICE O/P NEW LOW 30 MIN: CPT

## 2024-05-20 PROCEDURE — 83880 ASSAY OF NATRIURETIC PEPTIDE: CPT | Performed by: EMERGENCY MEDICINE

## 2024-05-20 PROCEDURE — 93005 ELECTROCARDIOGRAM TRACING: CPT

## 2024-05-20 PROCEDURE — 84484 ASSAY OF TROPONIN QUANT: CPT | Performed by: EMERGENCY MEDICINE

## 2024-05-20 PROCEDURE — 71046 X-RAY EXAM CHEST 2 VIEWS: CPT

## 2024-05-20 PROCEDURE — 85610 PROTHROMBIN TIME: CPT | Performed by: EMERGENCY MEDICINE

## 2024-05-20 PROCEDURE — 84484 ASSAY OF TROPONIN QUANT: CPT

## 2024-05-20 PROCEDURE — 85025 COMPLETE CBC W/AUTO DIFF WBC: CPT | Performed by: EMERGENCY MEDICINE

## 2024-05-20 PROCEDURE — 0241U HB NFCT DS VIR RESP RNA 4 TRGT: CPT | Performed by: EMERGENCY MEDICINE

## 2024-05-20 PROCEDURE — 84443 ASSAY THYROID STIM HORMONE: CPT

## 2024-05-20 PROCEDURE — 80053 COMPREHEN METABOLIC PANEL: CPT | Performed by: EMERGENCY MEDICINE

## 2024-05-20 PROCEDURE — 85730 THROMBOPLASTIN TIME PARTIAL: CPT | Performed by: EMERGENCY MEDICINE

## 2024-05-20 PROCEDURE — 36415 COLL VENOUS BLD VENIPUNCTURE: CPT | Performed by: EMERGENCY MEDICINE

## 2024-05-20 PROCEDURE — 85379 FIBRIN DEGRADATION QUANT: CPT | Performed by: EMERGENCY MEDICINE

## 2024-05-20 PROCEDURE — 83605 ASSAY OF LACTIC ACID: CPT | Performed by: EMERGENCY MEDICINE

## 2024-05-20 PROCEDURE — 99285 EMERGENCY DEPT VISIT HI MDM: CPT

## 2024-05-20 PROCEDURE — 87040 BLOOD CULTURE FOR BACTERIA: CPT | Performed by: EMERGENCY MEDICINE

## 2024-05-20 RX ORDER — HEPARIN SODIUM 1000 [USP'U]/ML
3200 INJECTION, SOLUTION INTRAVENOUS; SUBCUTANEOUS ONCE
Status: COMPLETED | OUTPATIENT
Start: 2024-05-20 | End: 2024-05-20

## 2024-05-20 RX ORDER — HEPARIN SODIUM 10000 [USP'U]/100ML
3-30 INJECTION, SOLUTION INTRAVENOUS
Status: DISCONTINUED | OUTPATIENT
Start: 2024-05-20 | End: 2024-05-23

## 2024-05-20 RX ORDER — HEPARIN SODIUM 1000 [USP'U]/ML
1600 INJECTION, SOLUTION INTRAVENOUS; SUBCUTANEOUS EVERY 6 HOURS PRN
Status: DISCONTINUED | OUTPATIENT
Start: 2024-05-20 | End: 2024-05-23

## 2024-05-20 RX ORDER — SIMETHICONE 80 MG
80 TABLET,CHEWABLE ORAL 4 TIMES DAILY PRN
Status: DISCONTINUED | OUTPATIENT
Start: 2024-05-20 | End: 2024-05-23 | Stop reason: HOSPADM

## 2024-05-20 RX ORDER — DOCUSATE SODIUM 100 MG/1
100 CAPSULE, LIQUID FILLED ORAL 2 TIMES DAILY
Status: DISCONTINUED | OUTPATIENT
Start: 2024-05-20 | End: 2024-05-23 | Stop reason: HOSPADM

## 2024-05-20 RX ORDER — HEPARIN SODIUM 1000 [USP'U]/ML
3200 INJECTION, SOLUTION INTRAVENOUS; SUBCUTANEOUS EVERY 6 HOURS PRN
Status: DISCONTINUED | OUTPATIENT
Start: 2024-05-20 | End: 2024-05-23

## 2024-05-20 RX ORDER — BROMFENAC 1.03 MG/ML
1 SOLUTION/ DROPS OPHTHALMIC 2 TIMES DAILY
COMMUNITY

## 2024-05-20 RX ORDER — FUROSEMIDE 40 MG/1
20 TABLET ORAL ONCE
Status: COMPLETED | OUTPATIENT
Start: 2024-05-20 | End: 2024-05-20

## 2024-05-20 RX ORDER — ACETAMINOPHEN 325 MG/1
650 TABLET ORAL EVERY 4 HOURS PRN
Status: DISCONTINUED | OUTPATIENT
Start: 2024-05-20 | End: 2024-05-23 | Stop reason: HOSPADM

## 2024-05-20 RX ADMIN — HEPARIN SODIUM 18 UNITS/KG/HR: 10000 INJECTION, SOLUTION INTRAVENOUS at 17:09

## 2024-05-20 RX ADMIN — HEPARIN SODIUM 3200 UNITS: 1000 INJECTION INTRAVENOUS; SUBCUTANEOUS at 17:08

## 2024-05-20 RX ADMIN — FUROSEMIDE 20 MG: 40 TABLET ORAL at 18:45

## 2024-05-20 RX ADMIN — IOHEXOL 80 ML: 350 INJECTION, SOLUTION INTRAVENOUS at 16:27

## 2024-05-20 NOTE — ASSESSMENT & PLAN NOTE
Endorses some difficulty with swallowing, states that she has hard time intermittently with pills. Has not had any EGD done recently. Discussed and would not want further workup for this  Will have speech evaluate patient, but hold on GI consult at this time.   Will place on dysphagia diet

## 2024-05-20 NOTE — PLAN OF CARE
Problem: SAFETY ADULT  Goal: Patient will remain free of falls  Description: INTERVENTIONS:  - Educate patient/family on patient safety including physical limitations  - Instruct patient to call for assistance with activity   - Consult OT/PT to assist with strengthening/mobility   - Keep Call bell within reach  - Keep bed low and locked with side rails adjusted as appropriate  - Keep care items and personal belongings within reach  - Initiate and maintain comfort rounds  - Make Fall Risk Sign visible to staff  - Offer Toileting every 2 Hours, in advance of need  - Initiate/Maintain 2alarm  - Obtain necessary fall risk management equipment: 2  - Apply yellow socks and bracelet for high fall risk patients  - Consider moving patient to room near nurses station  Outcome: Progressing  Goal: Maintain or return to baseline ADL function  Description: INTERVENTIONS:  -  Assess patient's ability to carry out ADLs; assess patient's baseline for ADL function and identify physical deficits which impact ability to perform ADLs (bathing, care of mouth/teeth, toileting, grooming, dressing, etc.)  - Assess/evaluate cause of self-care deficits   - Assess range of motion  - Assess patient's mobility; develop plan if impaired  - Assess patient's need for assistive devices and provide as appropriate  - Encourage maximum independence but intervene and supervise when necessary  - Involve family in performance of ADLs  - Assess for home care needs following discharge   - Consider OT consult to assist with ADL evaluation and planning for discharge  - Provide patient education as appropriate  Outcome: Progressing  Goal: Maintains/Returns to pre admission functional level  Description: INTERVENTIONS:  - Perform AM-PAC 6 Click Basic Mobility/ Daily Activity assessment daily.  - Set and communicate daily mobility goal to care team and patient/family/caregiver.   - Collaborate with rehabilitation services on mobility goals if consulted  -  Perform Range of Motion 2 times a day.  - Reposition patient every 2 hours.  - Dangle patient 2 times a day  - Stand patient 2 times a day  - Ambulate patient 2 times a day  - Out of bed to chair 2 times a day   - Out of bed for meals 2 times a day  - Out of bed for toileting  - Record patient progress and toleration of activity level   Outcome: Progressing     Problem: DISCHARGE PLANNING  Goal: Discharge to home or other facility with appropriate resources  Description: INTERVENTIONS:  - Identify barriers to discharge w/patient and caregiver  - Arrange for needed discharge resources and transportation as appropriate  - Identify discharge learning needs (meds, wound care, etc.)  - Arrange for interpretive services to assist at discharge as needed  - Refer to Case Management Department for coordinating discharge planning if the patient needs post-hospital services based on physician/advanced practitioner order or complex needs related to functional status, cognitive ability, or social support system  Outcome: Progressing     Problem: Knowledge Deficit  Goal: Patient/family/caregiver demonstrates understanding of disease process, treatment plan, medications, and discharge instructions  Description: Complete learning assessment and assess knowledge base.  Interventions:  - Provide teaching at level of understanding  - Provide teaching via preferred learning methods  Outcome: Progressing

## 2024-05-20 NOTE — H&P
[facility]  H&P  Name: Coco Ornelas 89 y.o. female I MRN: [id number]  Unit/Bed#: MATEO I Date of Admission: 5/20/2024   Date of Service: 5/20/2024 I Hospital Day: 0      Assessment & Plan   * Acute pulmonary embolism (HCC)  Assessment & Plan  Presented to ED with shortness of breath for the last 6 weeks but has been worsening recently. Patient now unable to lay flat when sleeping and needed to prop head up on pillows. Recently diagnosed with a heart murmur outpatient.   CXR pending read  EKG sinus rhythm HR 98 bpm with PAC and PVCs  D-dimer elevated at 2.54  Chest CTA pe study: Acute segmental and subsegmental pulmonary embolus in both upper lobes with nothing to indicate right heart strain. Groundglass opacity and septal thickening due to pulmonary edema. Occlusion of the right middle lobe bronchus with complete right middle lobe collapse, question due to benign mucous plugging versus malignancy. Pulmonary artery enlargement which can be a normal variant or seen with pulmonary hypertension.  LE Duplex US ordered  Echo ordered   Troponin 0hr 10, continue to trend    Heparin drip started  Will send eliquis to the pharmacy for price check  Discussion had with son in law at bedside regarding risks vs benefits of AC, understands that bleeding is risk of AC and agreeable to starting heparin gtt with transition to oral AC. Understands that if patient has cut or falls, should be re-evaluated in the ED.   Of note, patient is jehovah witness, no blood products if she does have decrease in hgb  Pain management  Supportive Care  Consult pulmonology    Acute respiratory failure (HCC)  Assessment & Plan  Currently saturating around 96% on 2L NC, found to be tachypneic and requiring o2. Does not wear o2 at baseline.  Suspect secondary to pulmonary embolism  Echo ordered  Pulm consulted  Does have some lower extremity swelling and concern for pulmonary edema, will order dose of lasix 20 mg po x 1.    Wean off oxygen to keep o2 saturations >89%.     Dysphagia  Assessment & Plan  Endorses some difficulty with swallowing, states that she has hard time intermittently with pills. Has not had any EGD done recently. Discussed and would not want further workup for this  Will have speech evaluate patient, but hold on GI consult at this time.   Will place on dysphagia diet    Glaucoma  Assessment & Plan  Currently taking using eye drops daily  Continue outpatient ophthalmology follow up           VTE Pharmacologic Prophylaxis: VTE Score: 6 High Risk (Score >/= 5) - Pharmacological DVT Prophylaxis Ordered: heparin drip. Sequential Compression Devices Ordered.  Code Status: No Order   Discussion with family: Updated  (son in law) at bedside.    Anticipated Length of Stay: Patient will be admitted on an inpatient basis with an anticipated length of stay of greater than 2 midnights secondary to acute pulmonary embolism.    Total Time Spent on Date of Encounter in care of patient: 65 mins. This time was spent on one or more of the following: performing physical exam; counseling and coordination of care; obtaining or reviewing history; documenting in the medical record; reviewing/ordering tests, medications or procedures; communicating with other healthcare professionals and discussing with patient's family/caregivers.    Chief Complaint: shortness of breath    History of Present Illness:  Coco Ornelas is a 89 y.o. female with a PMH of glaucoma and recently diagnosed CHF who presents with shortness of breath that has been worsening over the last 6 weeks. Patient states that she has been having worsening shortness of breath but has gotten worse the last few days where she is unable to lay flat to sleep and needs to prop her head up on pillows. States that she was having worsening symptoms with exertion as well. Recently was diagnosed with heart murmur per son-in-law, no history of heart failure. No chest pain,  fever, chills, nausea, vomiting, diarrhea, constipation, abdominal pain, congestion, cough. Feels that she has a little bit of lower extremity swelling as well. Does not have any history of DVT/PE. No recent travel.     Review of Systems:  Review of Systems   Constitutional:  Positive for activity change. Negative for appetite change, chills, fatigue, fever and unexpected weight change.   HENT: Negative.     Eyes: Negative.    Respiratory:  Positive for cough and shortness of breath. Negative for chest tightness and wheezing.    Cardiovascular:  Positive for leg swelling. Negative for chest pain and palpitations.   Gastrointestinal:  Negative for abdominal distention, abdominal pain, constipation, diarrhea, nausea and vomiting.   Endocrine: Negative.    Genitourinary: Negative.    Musculoskeletal: Negative.    Skin: Negative.    Allergic/Immunologic: Negative.    Neurological:  Positive for weakness. Negative for dizziness, syncope, speech difficulty, light-headedness and headaches.   Hematological: Negative.    Psychiatric/Behavioral: Negative.         Past Medical and Surgical History:   Past Medical History:   Diagnosis Date    Diarrhea     Glaucoma     Macular degeneration        Past Surgical History:   Procedure Laterality Date    NO PAST SURGERIES         Meds/Allergies:  Prior to Admission medications    Medication Sig Start Date End Date Taking? Authorizing Provider   Bromfenac Sodium, Once-Daily, 0.09 % SOLN Apply to eye in the morning    Historical Provider, MD     I have reviewed home medications with patient personally.    Allergies: No Known Allergies    Social History:  Marital Status: Unknown   Occupation:   Patient Pre-hospital Living Situation: Home  Patient Pre-hospital Level of Mobility: unable to be assessed at time of evaluation however will typically walk without cane or walker per son-in-law for the most part  Patient Pre-hospital Diet Restrictions:   Substance Use History:   Social History      Substance and Sexual Activity   Alcohol Use Never     Social History     Tobacco Use   Smoking Status Former    Types: Cigarettes   Smokeless Tobacco Never     Social History     Substance and Sexual Activity   Drug Use Never       Family History:  History reviewed. No pertinent family history.    Physical Exam:     Vitals:   Blood Pressure: 170/82 (05/20/24 1430)  Pulse: 105 (05/20/24 1430)  Temperature: 98.1 °F (36.7 °C) (05/20/24 1430)  Respirations: (!) 28 (05/20/24 1430)  Weight - Scale: 44 kg (97 lb) (05/20/24 1430)  SpO2: 90 % (05/20/24 1430)    Physical Exam  Vitals reviewed.   Constitutional:       General: She is not in acute distress.     Appearance: She is not ill-appearing or toxic-appearing.      Comments: Sitting pleasant in hospital bed, frail   HENT:      Mouth/Throat:      Mouth: Mucous membranes are moist.   Cardiovascular:      Rate and Rhythm: Regular rhythm. Tachycardia present.      Heart sounds: Murmur heard.   Pulmonary:      Effort: Tachypnea present. No respiratory distress.      Breath sounds: No stridor. No wheezing, rhonchi or rales.      Comments: Coarse breath sounds bilaterally, saturating around 95% on 2L NC  Abdominal:      General: Bowel sounds are normal. There is no distension.      Palpations: Abdomen is soft. There is no mass.      Tenderness: There is no abdominal tenderness.   Musculoskeletal:      Right lower leg: Edema present.      Left lower leg: Edema present.   Skin:     General: Skin is warm and dry.   Neurological:      Mental Status: She is alert and oriented to person, place, and time.      Comments: Hard of hearing   Psychiatric:         Mood and Affect: Mood normal.         Behavior: Behavior normal.          Additional Data:     Lab Results:  Results from last 7 days   Lab Units 05/20/24  1439   WBC Thousand/uL 7.51   HEMOGLOBIN g/dL 12.8   HEMATOCRIT % 40.4   PLATELETS Thousands/uL 305   SEGS PCT % 61   LYMPHO PCT % 19   MONO PCT % 10   EOS PCT % 8*      Results from last 7 days   Lab Units 05/20/24  1539   SODIUM mmol/L 136   POTASSIUM mmol/L 5.0   CHLORIDE mmol/L 106   CO2 mmol/L 25   BUN mg/dL 15   CREATININE mg/dL 0.70   ANION GAP mmol/L 5   CALCIUM mg/dL 8.5   ALBUMIN g/dL 3.1*   TOTAL BILIRUBIN mg/dL 0.75   ALK PHOS U/L 57   ALT U/L 10   AST U/L 30   GLUCOSE RANDOM mg/dL 91     Results from last 7 days   Lab Units 05/20/24  1439   INR  1.07             Results from last 7 days   Lab Units 05/20/24  1439   LACTIC ACID mmol/L 1.2       Lines/Drains:  Invasive Devices       Peripheral Intravenous Line  Duration             Peripheral IV 05/20/24 Right Wrist <1 day                        Imaging: Reviewed radiology reports from this admission including: CTA chest pe study  CTA ED chest PE study   Final Result by Sallie Allison MD (05/20 1704)      Acute segmental and subsegmental pulmonary embolus in both upper lobes with nothing to indicate right heart strain.      Groundglass opacity and septal thickening due to pulmonary edema.      Occlusion of the right middle lobe bronchus with complete right middle lobe collapse, question due to benign mucous plugging versus malignancy.      Pulmonary artery enlargement which can be a normal variant or seen with pulmonary hypertension.         I personally discussed this study with Dr. SUE PALOMARES on 5/20/2024 4:56 PM.                  Workstation performed: [id number]          VAS VENOUS DUPLEX - LOWER LIMB BILATERAL    (Results Pending)       EKG and Other Studies Reviewed on Admission:   EKG:  sinus rhythm HR 98 bpm with PAC and PVC.    ** Please Note: This note has been constructed using a voice recognition system. **

## 2024-05-20 NOTE — PROGRESS NOTES
[facility]        NAME: Coco Ornelas is a 89 y.o. female  : 1928    MRN: [id number]  DATE: May 20, 2024  TIME: 1:49 PM    Assessment and Plan   Shortness of breath [R06.02]  1. Shortness of breath  XR chest pa & lateral    XR chest pa & lateral    Transfer to other facility        Lungs clear on PE but initial SPO2 89-90% RA upon arrival and then increased to 94%. Ambulatory SpO2 91% RA.  Preliminary XR read concerning for fluid overload (new diagnosis CHF) and cardiomegaly, final read pending. Requires higher level of care and recommend she proceed to ED for further evaluation and management of symptoms. Patient and son-in-law agreeable. Complete assessment deferred to ED.     Patient Instructions     Patient Instructions     Follow up with PCP in 3-5 days.  Proceed to ER for further evaluation and management of symptoms     If tests have been performed at [facility], our office will contact you with results if changes need to be made to the care plan discussed with you at the visit.  You can review your full results on [facility] MyChart.      Shortness of Breath   AMBULATORY CARE:   Shortness of breath  is a feeling that you cannot get enough air when you breathe in. You may have this feeling only during activity, or all the time. Your symptoms can range from mild to severe. Shortness of breath may be a sign of a serious health condition that needs immediate care.  Seek care immediately if:   Your signs and symptoms are the same or worse within 24 hours of treatment.     The skin over your ribs or on your neck sinks in when you breathe.     You feel confused or dizzy.    Contact your healthcare provider if:   You have new or worsening symptoms.    You have questions or concerns about your condition or care.    Treatment:   Medicines  may be used to treat the cause of your symptoms. You may need medicine to treat a bacterial infection or reduce anxiety. Other medicines may be used to open your  airway, reduce swelling, or remove extra fluid. If you have a heart condition, you may need medicine to help your heart beat more strongly or regularly.    Oxygen  may be given to help you breathe more easily.    Manage shortness of breath:   Create an action plan.  You and your healthcare provider can work together to create a plan for how to handle shortness of breath. The plan can include daily activities, treatment changes, and what to do if you have severe breathing problems.    Lean forward on your elbows when you sit.  This helps your lungs expand and may make it easier to breathe.    Use pursed-lip breathing any time you feel short of breath.  Breathe in through your nose and then slowly breathe out through your mouth with your lips slightly puckered. It should take you twice as long to breathe out as it did to breathe in.         Do not smoke.  Nicotine and other chemicals in cigarettes and cigars can cause lung damage and make shortness of breath worse. Ask your healthcare provider for information if you currently smoke and need help to quit. E-cigarettes or smokeless tobacco still contain nicotine. Talk to your healthcare provider before you use these products.    Reach or maintain a healthy weight.  Your healthcare provider can help you create a safe weight loss plan if you are overweight.    Exercise as directed.  Exercise can help your lungs work more easily. Exercise can also help you lose weight if needed. Try to get at least 30 minutes of exercise most days of the week.    Follow up with your healthcare provider or specialist as directed:  Write down your questions so you remember to ask them during your visits.  © Copyright [employer] 2023 Information is for End User's use only and may not be sold, redistributed or otherwise used for commercial purposes.  The above information is an  only. It is not intended as medical advice for individual conditions or treatments. Talk to your  doctor, nurse or pharmacist before following any medical regimen to see if it is safe and effective for you.        Chief Complaint     Chief Complaint   Patient presents with    Shortness of Breath     Per pt son in law, pt was been short of breath for approx x6 weeks pt states she has no pain , just trouble breathing.         History of Present Illness       89-year-old female with no significant past medical history presents with family member for complaints of shortness of breath x 6 weeks.  Son-in-law reports shortness of breath has recently increased where patient now unable to lay flat when sleeping and will prop head elevated on pillows.  Symptoms increase with exertion but denies fever, chills, nasal congestion, cough, vomiting, or diarrhea.  Son-in-law has also noticed increased swelling to bilateral lower extremities. Out of touch with PCP due to COVID-19 pandemic.     Shortness of Breath  Associated symptoms include leg swelling. Pertinent negatives include no chest pain, coughing, palpitations, rhinorrhea, sore throat or wheezing.       Review of Systems   Review of Systems   Constitutional:  Negative for chills and fever.   HENT:  Negative for congestion, ear discharge, ear pain, rhinorrhea and sore throat.    Eyes:  Negative for discharge.   Respiratory:  Positive for chest tightness and shortness of breath. Negative for cough and wheezing.    Cardiovascular:  Positive for leg swelling. Negative for chest pain and palpitations.   Gastrointestinal:  Negative for abdominal pain, diarrhea, nausea and vomiting.   Skin:  Negative for rash.   Neurological:  Positive for weakness.         Current Medications       Current Outpatient Medications:     Bromfenac Sodium, Once-Daily, 0.09 % SOLN, Apply to eye in the morning, Disp: , Rfl:     Current Allergies     Allergies as of 05/20/2024    (No Known Allergies)            The following portions of the patient's history were reviewed and updated as appropriate:  allergies, current medications, past family history, past medical history, past social history, past surgical history and problem list.     Past Medical History:   Diagnosis Date    Diarrhea     Glaucoma     Macular degeneration        Past Surgical History:   Procedure Laterality Date    NO PAST SURGERIES         History reviewed. No pertinent family history.      Medications have been verified.        Objective   /68   Pulse 91   Temp 97.6 °F (36.4 °C)   Resp (!) 44   Wt 44 kg (97 lb)   SpO2 94%   No LMP recorded. Patient is postmenopausal.       Physical Exam     Physical Exam  Vitals and nursing note reviewed.   Constitutional:       General: She is not in acute distress.     Appearance: She is not toxic-appearing.   HENT:      Head: Normocephalic.      Nose: Nose normal.      Mouth/Throat:      Mouth: Mucous membranes are moist.      Pharynx: Oropharynx is clear.   Eyes:      Conjunctiva/sclera: Conjunctivae normal.   Cardiovascular:      Rate and Rhythm: Normal rate and regular rhythm.      Heart sounds: Normal heart sounds.   Pulmonary:      Effort: Tachypnea present.      Breath sounds: No stridor. No wheezing, rhonchi or rales.   Musculoskeletal:      Right lower leg: Edema present.      Left lower leg: Edema present.   Lymphadenopathy:      Cervical: No cervical adenopathy.   Skin:     General: Skin is warm and dry.      Coloration: Skin is pale.   Neurological:      Mental Status: She is alert and oriented to person, place, and time.   Psychiatric:         Mood and Affect: Mood normal.         Behavior: Behavior normal.

## 2024-05-20 NOTE — PATIENT INSTRUCTIONS
Follow up with PCP in 3-5 days.  Proceed to ER for further evaluation and management of symptoms     If tests have been performed at [facility], our office will contact you with results if changes need to be made to the care plan discussed with you at the visit.  You can review your full results on [facility] MyChart.      Shortness of Breath   AMBULATORY CARE:   Shortness of breath  is a feeling that you cannot get enough air when you breathe in. You may have this feeling only during activity, or all the time. Your symptoms can range from mild to severe. Shortness of breath may be a sign of a serious health condition that needs immediate care.  Seek care immediately if:   Your signs and symptoms are the same or worse within 24 hours of treatment.     The skin over your ribs or on your neck sinks in when you breathe.     You feel confused or dizzy.    Contact your healthcare provider if:   You have new or worsening symptoms.    You have questions or concerns about your condition or care.    Treatment:   Medicines  may be used to treat the cause of your symptoms. You may need medicine to treat a bacterial infection or reduce anxiety. Other medicines may be used to open your airway, reduce swelling, or remove extra fluid. If you have a heart condition, you may need medicine to help your heart beat more strongly or regularly.    Oxygen  may be given to help you breathe more easily.    Manage shortness of breath:   Create an action plan.  You and your healthcare provider can work together to create a plan for how to handle shortness of breath. The plan can include daily activities, treatment changes, and what to do if you have severe breathing problems.    Lean forward on your elbows when you sit.  This helps your lungs expand and may make it easier to breathe.    Use pursed-lip breathing any time you feel short of breath.  Breathe in through your nose and then slowly breathe out through your mouth with your lips  slightly puckered. It should take you twice as long to breathe out as it did to breathe in.         Do not smoke.  Nicotine and other chemicals in cigarettes and cigars can cause lung damage and make shortness of breath worse. Ask your healthcare provider for information if you currently smoke and need help to quit. E-cigarettes or smokeless tobacco still contain nicotine. Talk to your healthcare provider before you use these products.    Reach or maintain a healthy weight.  Your healthcare provider can help you create a safe weight loss plan if you are overweight.    Exercise as directed.  Exercise can help your lungs work more easily. Exercise can also help you lose weight if needed. Try to get at least 30 minutes of exercise most days of the week.    Follow up with your healthcare provider or specialist as directed:  Write down your questions so you remember to ask them during your visits.  © Copyright [employer] 2023 Information is for End User's use only and may not be sold, redistributed or otherwise used for commercial purposes.  The above information is an  only. It is not intended as medical advice for individual conditions or treatments. Talk to your doctor, nurse or pharmacist before following any medical regimen to see if it is safe and effective for you.

## 2024-05-20 NOTE — ASSESSMENT & PLAN NOTE
Currently saturating around 96% on 2L NC, found to be tachypneic and requiring o2. Does not wear o2 at baseline.  Suspect secondary to pulmonary embolism  Echo ordered  Pulm consulted  Does have some lower extremity swelling and concern for pulmonary edema, will order dose of lasix 20 mg po x 1.   Wean off oxygen to keep o2 saturations >89%.

## 2024-05-20 NOTE — ED PROVIDER NOTES
History  Chief Complaint   Patient presents with    Shortness of Breath     Sent from  for further evaluation of worsening SOB over the past 4-6 weeks with bilateral leg swelling.      Patient is a 89-year-old female presenting to the emergency department from urgent care secondary to worsening shortness of breath with dyspnea on exertion over the past few weeks, she is also noted to have some swelling in her lower extremities, she denies cough, no fevers, no sick contacts, patient denies any chest pain, no nausea vomiting or diarrhea, patient noted to be hypoxic at 88 to 90% on room air, particularly after minimal exertion        Prior to Admission Medications   Prescriptions Last Dose Informant Patient Reported? Taking?   Bromfenac Sodium, Once-Daily, 0.09 % SOLN 5/20/2024  Yes Yes   Sig: Apply to eye in the morning      Facility-Administered Medications: None       Past Medical History:   Diagnosis Date    Diarrhea     Glaucoma     Macular degeneration        Past Surgical History:   Procedure Laterality Date    NO PAST SURGERIES         History reviewed. No pertinent family history.  I have reviewed and agree with the history as documented.    E-Cigarette/Vaping    E-Cigarette Use Never User      E-Cigarette/Vaping Substances     Social History     Tobacco Use    Smoking status: Former     Types: Cigarettes    Smokeless tobacco: Never   Vaping Use    Vaping status: Never Used   Substance Use Topics    Alcohol use: Never    Drug use: Never       Review of Systems   Constitutional:  Positive for fatigue.   HENT: Negative.     Eyes: Negative.    Respiratory:  Positive for shortness of breath.    Cardiovascular: Negative.    Gastrointestinal: Negative.    Endocrine: Negative.    Genitourinary: Negative.    Musculoskeletal: Negative.    Skin: Negative.    Allergic/Immunologic: Negative.    Neurological: Negative.    Hematological: Negative.    Psychiatric/Behavioral: Negative.         Physical Exam  Physical  Exam  Constitutional:       Appearance: She is well-developed.   HENT:      Head: Normocephalic and atraumatic.   Eyes:      Conjunctiva/sclera: Conjunctivae normal.      Pupils: Pupils are equal, round, and reactive to light.   Cardiovascular:      Rate and Rhythm: Regular rhythm. Tachycardia present.   Pulmonary:      Effort: Pulmonary effort is normal.      Breath sounds: Decreased breath sounds present.   Abdominal:      Palpations: Abdomen is soft.   Musculoskeletal:         General: Normal range of motion.      Cervical back: Normal range of motion and neck supple.   Skin:     General: Skin is warm and dry.   Neurological:      Mental Status: She is alert and oriented to person, place, and time.         Vital Signs  ED Triage Vitals   Temperature Pulse Respirations Blood Pressure SpO2   05/20/24 1430 05/20/24 1430 05/20/24 1430 05/20/24 1430 05/20/24 1430   98.1 °F (36.7 °C) 105 (!) 28 170/82 90 %      Temp Source Heart Rate Source Patient Position - Orthostatic VS BP Location FiO2 (%)   05/20/24 1817 05/20/24 1430 05/20/24 1430 05/20/24 1430 --   Temporal Monitor Lying Left arm       Pain Score       05/20/24 1430       No Pain           Vitals:    05/20/24 1430 05/20/24 1817   BP: 170/82 166/92   Pulse: 105 101   Patient Position - Orthostatic VS: Lying          Visual Acuity      ED Medications  Medications   heparin (porcine) 25,000 units in 0.45% NaCl 250 mL infusion (premix) (18 Units/kg/hr × 40 kg (Order-Specific) Intravenous New Bag 5/20/24 1709)   heparin (porcine) injection 3,200 Units (has no administration in time range)   heparin (porcine) injection 1,600 Units (has no administration in time range)   acetaminophen (TYLENOL) tablet 650 mg (has no administration in time range)   docusate sodium (COLACE) capsule 100 mg (100 mg Oral Not Given 5/20/24 1845)   trimethobenzamide (TIGAN) IM injection 200 mg (has no administration in time range)   simethicone (MYLICON) chewable tablet 80 mg (has no  administration in time range)   iohexol (OMNIPAQUE) 350 MG/ML injection (MULTI-DOSE) 80 mL (80 mL Intravenous Given 5/20/24 1627)   heparin (porcine) injection 3,200 Units (3,200 Units Intravenous Given 5/20/24 1708)   furosemide (LASIX) tablet 20 mg (20 mg Oral Given 5/20/24 1845)       Diagnostic Studies  Results Reviewed       Procedure Component Value Units Date/Time    HS Troponin I 2hr [[id number]] Collected: 05/20/24 1857    Lab Status: In process Specimen: Blood from Arm, Right Updated: 05/20/24 1859    TSH, 3rd generation with Free T4 reflex [[id number]]  (Normal) Collected: 05/20/24 1539    Lab Status: Final result Specimen: Blood from Arm, Left Updated: 05/20/24 1851     TSH 3RD GENERATON 2.908 uIU/mL     HS Troponin I 4hr [[id number]]     Lab Status: No result Specimen: Blood     Comprehensive metabolic panel [[id number]]  (Abnormal) Collected: 05/20/24 1539    Lab Status: Final result Specimen: Blood from Arm, Left Updated: 05/20/24 1601     Sodium 136 mmol/L      Potassium 5.0 mmol/L      Chloride 106 mmol/L      CO2 25 mmol/L      ANION GAP 5 mmol/L      BUN 15 mg/dL      Creatinine 0.70 mg/dL      Glucose 91 mg/dL      Calcium 8.5 mg/dL      Corrected Calcium 9.2 mg/dL      AST 30 U/L      ALT 10 U/L      Alkaline Phosphatase 57 U/L      Total Protein 6.9 g/dL      Albumin 3.1 g/dL      Total Bilirubin 0.75 mg/dL      eGFR 73 ml/min/1.73sq m     Narrative:      National Kidney Disease Foundation guidelines for Chronic Kidney Disease (CKD):     Stage 1 with normal or high GFR (GFR > 90 mL/min/1.73 square meters)    Stage 2 Mild CKD (GFR = 60-89 mL/min/1.73 square meters)    Stage 3A Moderate CKD (GFR = 45-59 mL/min/1.73 square meters)    Stage 3B Moderate CKD (GFR = 30-44 mL/min/1.73 square meters)    Stage 4 Severe CKD (GFR = 15-29 mL/min/1.73 square meters)    Stage 5 End Stage CKD (GFR <15 mL/min/1.73 square meters)  Note: GFR calculation is accurate only with a steady state creatinine    Blood culture  #1 [[id number]] Collected: 05/20/24 1536    Lab Status: In process Specimen: Blood from Arm, Left Updated: 05/20/24 1541    FLU/RSV/COVID - if FLU/RSV clinically relevant [[id number]]  (Normal) Collected: 05/20/24 1439    Lab Status: Final result Specimen: Nares from Nose Updated: 05/20/24 1528     SARS-CoV-2 Negative     INFLUENZA A PCR Negative     INFLUENZA B PCR Negative     RSV PCR Negative    Narrative:      FOR PEDIATRIC PATIENTS - copy/paste COVID Guidelines URL to browser: [email or web address]    SARS-CoV-2 assay is a Nucleic Acid Amplification assay intended for the  qualitative detection of nucleic acid from SARS-CoV-2 in nasopharyngeal  swabs. Results are for the presumptive identification of SARS-CoV-2 RNA.    Positive results are indicative of infection with SARS-CoV-2, the virus  causing COVID-19, but do not rule out bacterial infection or co-infection  with other viruses. Laboratories within the [place] and its  territories are required to report all positive results to the appropriate  public health authorities. Negative results do not preclude SARS-CoV-2  infection and should not be used as the sole basis for treatment or other  patient management decisions. Negative results must be combined with  clinical observations, patient history, and epidemiological information.  This test has not been FDA cleared or approved.    This test has been authorized by FDA under an Emergency Use Authorization  (EUA). This test is only authorized for the duration of time the  declaration that circumstances exist justifying the authorization of the  emergency use of an in vitro diagnostic tests for detection of SARS-CoV-2  virus and/or diagnosis of COVID-19 infection under section 564(b)(1) of  the Act, 21 U.S.C. 360bbb-3(b)(1), unless the authorization is terminated  or revoked sooner. The test has been validated but independent review by FDA  and CLIA is  pending.    Test performed using [employer] GeneXpert: This RT-PCR assay targets N2,  a region unique to SARS-CoV-2. A conserved region in the E-gene was chosen  for pan-Sarbecovirus detection which includes SARS-CoV-2.    According to CMS-2020-01-R, this platform meets the definition of high-throughput technology.    Lactic acid, plasma (w/reflex if result > 2.0) [[id number]]  (Normal) Collected: 05/20/24 1439    Lab Status: Final result Specimen: Blood from Arm, Right Updated: 05/20/24 1522     LACTIC ACID 1.2 mmol/L     Narrative:      Result may be elevated if tourniquet was used during collection.    Magnesium [[id number]]  (Normal) Collected: 05/20/24 1439    Lab Status: Final result Specimen: Blood from Arm, Right Updated: 05/20/24 1522     Magnesium 2.5 mg/dL     HS Troponin 0hr (reflex protocol) [[id number]]  (Normal) Collected: 05/20/24 1439    Lab Status: Final result Specimen: Blood from Arm, Right Updated: 05/20/24 1518     hs TnI 0hr 10 ng/L     B-Type Natriuretic Peptide(BNP) [[id number]]  (Abnormal) Collected: 05/20/24 1439    Lab Status: Final result Specimen: Blood from Arm, Right Updated: 05/20/24 1518      pg/mL     D-Dimer [[id number]]  (Abnormal) Collected: 05/20/24 1439    Lab Status: Final result Specimen: Blood from Arm, Right Updated: 05/20/24 1511     D-Dimer, Quant 2.54 ug/ml FEU     Narrative:      In the evaluation for possible pulmonary embolism, in the appropriate (Well's Score of 4 or less) patient, the age adjusted d-dimer cutoff for this patient can be calculated as:    Age x 0.01 (in ug/mL) for Age-adjusted D-dimer exclusion threshold for a patient over 50 years.    Protime-INR [[id number]]  (Normal) Collected: 05/20/24 1439    Lab Status: Final result Specimen: Blood from Arm, Right Updated: 05/20/24 1505     Protime 14.2 seconds      INR 1.07    APTT [[id number]]  (Normal) Collected: 05/20/24 1439    Lab Status: Final result Specimen: Blood from Arm, Right Updated: 05/20/24 1674      PTT 26 seconds     CBC and differential [[id number]]  (Abnormal) Collected: 05/20/24 1439    Lab Status: Final result Specimen: Blood from Arm, Right Updated: 05/20/24 1449     WBC 7.51 Thousand/uL      RBC 4.38 Million/uL      Hemoglobin 12.8 g/dL      Hematocrit 40.4 %      MCV 92 fL      MCH 29.2 pg      MCHC 31.7 g/dL      RDW 14.1 %      MPV 9.4 fL      Platelets 305 Thousands/uL      nRBC 0 /100 WBCs      Segmented % 61 %      Immature Grans % 1 %      Lymphocytes % 19 %      Monocytes % 10 %      Eosinophils Relative 8 %      Basophils Relative 1 %      Absolute Neutrophils 4.68 Thousands/µL      Absolute Immature Grans 0.05 Thousand/uL      Absolute Lymphocytes 1.41 Thousands/µL      Absolute Monocytes 0.71 Thousand/µL      Eosinophils Absolute 0.57 Thousand/µL      Basophils Absolute 0.09 Thousands/µL     Blood culture #2 [[id number]] Collected: 05/20/24 1439    Lab Status: In process Specimen: Blood from Arm, Right Updated: 05/20/24 1446                   CTA ED chest PE study   Final Result by Sallie Allison MD (05/20 1704)      Acute segmental and subsegmental pulmonary embolus in both upper lobes with nothing to indicate right heart strain.      Groundglass opacity and septal thickening due to pulmonary edema.      Occlusion of the right middle lobe bronchus with complete right middle lobe collapse, question due to benign mucous plugging versus malignancy.      Pulmonary artery enlargement which can be a normal variant or seen with pulmonary hypertension.         I personally discussed this study with Dr. URMILA PALOMARES on 5/20/2024 4:56 PM.                  Workstation performed: [id number]          VAS VENOUS DUPLEX - LOWER LIMB BILATERAL    (Results Pending)              Procedures  ECG 12 Lead Documentation Only    Date/Time: 5/20/2024 6:54 PM    Performed by: Urmila Palomares DO  Authorized by: Urmila Palomares DO    Indications / Diagnosis:  Shortness of breath  ECG reviewed by me, the ED  Provider: yes    Patient location:  ED  Previous ECG:     Comparison to cardiac monitor: Yes    Interpretation:     Interpretation: non-specific    Rate:     ECG rate:  98  Rhythm:     Rhythm: sinus rhythm    Ectopy:     Ectopy: PAC    QRS:     QRS intervals:  Normal  Conduction:     Conduction: normal    ST segments:     ST segments:  Normal  T waves:     T waves: normal             ED Course                               SBIRT 22yo+      Flowsheet Row Most Recent Value   Initial Alcohol Screen: US AUDIT-C     1. How often do you have a drink containing alcohol? 0 Filed at: 05/20/2024 1503   2. How many drinks containing alcohol do you have on a typical day you are drinking?  0 Filed at: 05/20/2024 1503   3b. FEMALE Any Age, or MALE 65+: How often do you have 4 or more drinks on one occassion? 0 Filed at: 05/20/2024 1503   Audit-C Score 0 Filed at: 05/20/2024 1503   RENE: How many times in the past year have you...    Used an illegal drug or used a prescription medication for non-medical reasons? Never Filed at: 05/20/2024 1503                      Medical Decision Making  Presentation concerning for pulmonary embolism with mild hypoxia requiring nasal cannula oxygen.  I have low suspicion for ACS, pneumothorax, pneumonia, tamponade.  Patient's vital signs are stable and she appears in no acute distress.  Will start on heparin drip, and admit for further evaluation and treatment.    Problems Addressed:  Hypoxia: acute illness or injury  Multiple subsegmental pulmonary emboli without acute cor pulmonale (HCC): acute illness or injury    Amount and/or Complexity of Data Reviewed  Labs: ordered. Decision-making details documented in ED Course.  Radiology: ordered and independent interpretation performed. Decision-making details documented in ED Course.  ECG/medicine tests: ordered and independent interpretation performed. Decision-making details documented in ED Course.    Risk  Prescription drug management.  Decision  regarding hospitalization.             Disposition  Final diagnoses:   Hypoxia   Multiple subsegmental pulmonary emboli without acute cor pulmonale (HCC)     Time reflects when diagnosis was documented in both MDM as applicable and the Disposition within this note       Time User Action Codes Description Comment    5/20/2024  4:56 PM Urmila Mclaughlin Add [R09.02] Hypoxia     5/20/2024  4:56 PM Urmila Mclaughlin Add [I26.94] Multiple subsegmental pulmonary emboli without acute cor pulmonale (HCC)     5/20/2024  5:52 PM Cayla Marmolejo Add [J96.01] Acute respiratory failure with hypoxia (HCC)           ED Disposition       ED Disposition   Admit    Condition   Stable    Date/Time   Mon May 20, 2024 7490    Comment   Case was discussed with Dr Lopez and the patient's admission status was agreed to be Admission Status: inpatient status to the service of Dr. Lopez .               Follow-up Information    None         Current Discharge Medication List        CONTINUE these medications which have NOT CHANGED    Details   Bromfenac Sodium, Once-Daily, 0.09 % SOLN Apply to eye in the morning             No discharge procedures on file.    PDMP Review       None            ED Provider  Electronically Signed by             Urmila Mclaughlin DO  05/20/24 5382

## 2024-05-20 NOTE — ASSESSMENT & PLAN NOTE
Presented to ED with shortness of breath for the last 6 weeks but has been worsening recently. Patient now unable to lay flat when sleeping and needed to prop head up on pillows. Recently diagnosed with a heart murmur outpatient.   CXR pending read  EKG sinus rhythm HR 98 bpm with PAC and PVCs  D-dimer elevated at 2.54  Chest CTA pe study: Acute segmental and subsegmental pulmonary embolus in both upper lobes with nothing to indicate right heart strain. Groundglass opacity and septal thickening due to pulmonary edema. Occlusion of the right middle lobe bronchus with complete right middle lobe collapse, question due to benign mucous plugging versus malignancy. Pulmonary artery enlargement which can be a normal variant or seen with pulmonary hypertension.  LE Duplex US ordered  Echo ordered   Troponin 0hr 10, continue to trend    Heparin drip started  Will send eliquis to the pharmacy for price check  Discussion had with son in law at bedside regarding risks vs benefits of AC, understands that bleeding is risk of AC and agreeable to starting heparin gtt with transition to oral AC. Understands that if patient has cut or falls, should be re-evaluated in the ED.   Of note, patient is jehovah witness, no blood products if she does have decrease in hgb  Pain management  Supportive Care  Consult pulmonology

## 2024-05-21 ENCOUNTER — APPOINTMENT (INPATIENT)
Dept: NON INVASIVE DIAGNOSTICS | Facility: HOSPITAL | Age: 89
DRG: 175 | End: 2024-05-21
Payer: COMMERCIAL

## 2024-05-21 PROBLEM — E43 SEVERE PROTEIN-CALORIE MALNUTRITION (HCC): Status: ACTIVE | Noted: 2024-05-21

## 2024-05-21 LAB
ANION GAP SERPL CALCULATED.3IONS-SCNC: 8 MMOL/L (ref 4–13)
AORTIC ROOT: 2.5 CM
AORTIC VALVE MEAN VELOCITY: 8.8 M/S
APICAL FOUR CHAMBER EJECTION FRACTION: 49 %
APTT PPP: 150 SECONDS (ref 23–37)
APTT PPP: 63 SECONDS (ref 23–37)
APTT PPP: 78 SECONDS (ref 23–37)
ASCENDING AORTA: 2.9 CM
ATRIAL RATE: 96 BPM
AV AREA BY CONTINUOUS VTI: 1.8 CM2
AV AREA PEAK VELOCITY: 1.6 CM2
AV LVOT MEAN GRADIENT: 1 MMHG
AV LVOT PEAK GRADIENT: 2 MMHG
AV MEAN GRADIENT: 4 MMHG
AV PEAK GRADIENT: 8 MMHG
AV REGURGITATION PRESSURE HALF TIME: 273 MS
AV VALVE AREA: 1.79 CM2
AV VELOCITY RATIO: 0.5
BSA FOR ECHO PROCEDURE: 1.36 M2
BUN SERPL-MCNC: 18 MG/DL (ref 5–25)
CALCIUM SERPL-MCNC: 8.2 MG/DL (ref 8.4–10.2)
CHLORIDE SERPL-SCNC: 102 MMOL/L (ref 96–108)
CO2 SERPL-SCNC: 27 MMOL/L (ref 21–32)
CREAT SERPL-MCNC: 0.77 MG/DL (ref 0.6–1.3)
DOP CALC AO PEAK VEL: 1.39 M/S
DOP CALC AO VTI: 24.82 CM
DOP CALC LVOT AREA: 3.14 CM2
DOP CALC LVOT CARDIAC INDEX: 2.51 L/MIN/M2
DOP CALC LVOT CARDIAC OUTPUT: 3.41 L/MIN
DOP CALC LVOT DIAMETER: 2 CM
DOP CALC LVOT PEAK VEL VTI: 14.13 CM
DOP CALC LVOT PEAK VEL: 0.7 M/S
DOP CALC LVOT STROKE INDEX: 30.9 ML/M2
DOP CALC LVOT STROKE VOLUME: 44.37
DOP CALC MV VTI: 43.61 CM
E WAVE DECELERATION TIME: 191 MS
E/A RATIO: 1.29
ERYTHROCYTE [DISTWIDTH] IN BLOOD BY AUTOMATED COUNT: 14 % (ref 11.6–15.1)
FRACTIONAL SHORTENING: 31 (ref 28–44)
GFR SERPL CREATININE-BSD FRML MDRD: 65 ML/MIN/1.73SQ M
GLUCOSE SERPL-MCNC: 119 MG/DL (ref 65–140)
HCT VFR BLD AUTO: 38.1 % (ref 34.8–46.1)
HGB BLD-MCNC: 12.3 G/DL (ref 11.5–15.4)
INTERVENTRICULAR SEPTUM IN DIASTOLE (PARASTERNAL SHORT AXIS VIEW): 1.2 CM
INTERVENTRICULAR SEPTUM: 0.9 CM (ref 0.6–1.1)
LAAS-AP2: 16.2 CM2
LAAS-AP4: 24.1 CM2
LEFT ATRIUM SIZE: 3.9 CM
LEFT ATRIUM VOLUME (MOD BIPLANE): 52 ML
LEFT ATRIUM VOLUME INDEX (MOD BIPLANE): 38.2 ML/M2
LEFT INTERNAL DIMENSION IN SYSTOLE: 3.1 CM (ref 2.1–4)
LEFT VENTRICLE DIASTOLIC VOLUME (MOD BIPLANE): 47 ML
LEFT VENTRICLE DIASTOLIC VOLUME INDEX (MOD BIPLANE): 34.6 ML/M2
LEFT VENTRICLE SYSTOLIC VOLUME (MOD BIPLANE): 20 ML
LEFT VENTRICLE SYSTOLIC VOLUME INDEX (MOD BIPLANE): 14.7 ML/M2
LEFT VENTRICULAR INTERNAL DIMENSION IN DIASTOLE: 4.5 CM (ref 3.5–6)
LEFT VENTRICULAR POSTERIOR WALL IN END DIASTOLE: 1.4 CM
LEFT VENTRICULAR STROKE VOLUME: 55 ML
LV EF: 58 %
LVSV (TEICH): 55 ML
MAGNESIUM SERPL-MCNC: 1.9 MG/DL (ref 1.9–2.7)
MCH RBC QN AUTO: 29.6 PG (ref 26.8–34.3)
MCHC RBC AUTO-ENTMCNC: 32.3 G/DL (ref 31.4–37.4)
MCV RBC AUTO: 92 FL (ref 82–98)
MITRAL REGURGITATION PEAK VELOCITY: 5.08 M/S
MITRAL VALVE MEAN INFLOW VELOCITY: 3.72 M/S
MITRAL VALVE REGURGITANT PEAK GRADIENT: 103 MMHG
MV E'TISSUE VEL-LAT: 3 CM/S
MV E'TISSUE VEL-SEP: 4 CM/S
MV MEAN GRADIENT: 8 MMHG
MV PEAK A VEL: 1.41 M/S
MV PEAK E VEL: 182 CM/S
MV PEAK GRADIENT: 18 MMHG
MV STENOSIS PRESSURE HALF TIME: 55 MS
MV VALVE AREA BY CONTINUITY EQUATION: 1.02 CM2
MV VALVE AREA P 1/2 METHOD: 4
P AXIS: 82 DEGREES
PLATELET # BLD AUTO: 307 THOUSANDS/UL (ref 149–390)
PMV BLD AUTO: 9 FL (ref 8.9–12.7)
POTASSIUM SERPL-SCNC: 3.7 MMOL/L (ref 3.5–5.3)
PR INTERVAL: 130 MS
QRS AXIS: 6 DEGREES
QRSD INTERVAL: 92 MS
QT INTERVAL: 386 MS
QTC INTERVAL: 487 MS
RBC # BLD AUTO: 4.15 MILLION/UL (ref 3.81–5.12)
RIGHT ATRIUM AREA SYSTOLE A4C: 16.1 CM2
RIGHT VENTRICLE ID DIMENSION: 4 CM
SL CV AV DECELERATION TIME RETROGRADE: 940 MS
SL CV AV PEAK GRADIENT RETROGRADE: 69 MMHG
SL CV DOP CALC MV VTI RETROGRADE: 127.4 CM
SL CV LEFT ATRIUM LENGTH A2C: 5.7 CM
SL CV MV MEAN GRADIENT RETROGRADE: 64 MMHG
SL CV PED ECHO LEFT VENTRICLE DIASTOLIC VOLUME (MOD BIPLANE) 2D: 92 ML
SL CV PED ECHO LEFT VENTRICLE SYSTOLIC VOLUME (MOD BIPLANE) 2D: 38 ML
SODIUM SERPL-SCNC: 137 MMOL/L (ref 135–147)
T WAVE AXIS: 52 DEGREES
TR MAX PG: 47 MMHG
TR PEAK VELOCITY: 3.4 M/S
TRICUSPID ANNULAR PLANE SYSTOLIC EXCURSION: 1.5 CM
TRICUSPID VALVE PEAK REGURGITATION VELOCITY: 3.42 M/S
VENTRICULAR RATE: 96 BPM
WBC # BLD AUTO: 8.11 THOUSAND/UL (ref 4.31–10.16)

## 2024-05-21 PROCEDURE — 93306 TTE W/DOPPLER COMPLETE: CPT | Performed by: INTERNAL MEDICINE

## 2024-05-21 PROCEDURE — 93306 TTE W/DOPPLER COMPLETE: CPT

## 2024-05-21 PROCEDURE — 99223 1ST HOSP IP/OBS HIGH 75: CPT | Performed by: INTERNAL MEDICINE

## 2024-05-21 PROCEDURE — 80048 BASIC METABOLIC PNL TOTAL CA: CPT

## 2024-05-21 PROCEDURE — 85027 COMPLETE CBC AUTOMATED: CPT

## 2024-05-21 PROCEDURE — 85730 THROMBOPLASTIN TIME PARTIAL: CPT | Performed by: HOSPITALIST

## 2024-05-21 PROCEDURE — 93970 EXTREMITY STUDY: CPT | Performed by: SURGERY

## 2024-05-21 PROCEDURE — 92610 EVALUATE SWALLOWING FUNCTION: CPT

## 2024-05-21 PROCEDURE — 85730 THROMBOPLASTIN TIME PARTIAL: CPT | Performed by: FAMILY MEDICINE

## 2024-05-21 PROCEDURE — 83735 ASSAY OF MAGNESIUM: CPT

## 2024-05-21 PROCEDURE — 93010 ELECTROCARDIOGRAM REPORT: CPT | Performed by: INTERNAL MEDICINE

## 2024-05-21 PROCEDURE — 93970 EXTREMITY STUDY: CPT

## 2024-05-21 PROCEDURE — 99232 SBSQ HOSP IP/OBS MODERATE 35: CPT

## 2024-05-21 PROCEDURE — 93005 ELECTROCARDIOGRAM TRACING: CPT

## 2024-05-21 RX ORDER — BROMFENAC 1.03 MG/ML
1 SOLUTION/ DROPS OPHTHALMIC 2 TIMES DAILY
Status: DISCONTINUED | OUTPATIENT
Start: 2024-05-21 | End: 2024-05-23 | Stop reason: HOSPADM

## 2024-05-21 RX ORDER — FUROSEMIDE 20 MG/1
20 TABLET ORAL ONCE
Status: COMPLETED | OUTPATIENT
Start: 2024-05-21 | End: 2024-05-21

## 2024-05-21 RX ADMIN — FUROSEMIDE 20 MG: 20 TABLET ORAL at 15:45

## 2024-05-21 RX ADMIN — BROMFENAC OPHTHALMIC SOLUTION 0.09% 1 DROP: 1.03 SOLUTION/ DROPS OPHTHALMIC at 20:31

## 2024-05-21 NOTE — SPEECH THERAPY NOTE
Speech Language/Pathology  Speech-Language Pathology Bedside Swallow Evaluation      Patient Name: Coco Ornelas    Today's Date: 5/21/2024       Summary   Consult received for bedside swallow assessment. Pt admitted w/ acute PE. PMHx includes dysphagia and glaucoma. Currently on level 3 dental soft/ thin liquids. Denies difficulty w/ food but reports she doesn't eat a lot of meat. Endorses difficulty w/ pills at times but reports she does not take a lot of medicine. Pt tangential and focused on not wanting to be OOB and that we give her too much food. Observed tolerating chopped meats, puree, and thins via straw. No overt s/s aspiration.   Recommend trialing meds whole in puree    Risk/s for Aspiration: Low     Recommended Diet: soft/level 3 diet and thin liquids   Recommended Form of Meds: whole with puree   Aspiration precautions and swallowing strategies: upright posture  Other Recommendations: Continue frequent oral care,        Current Medical Status  Coco Ornelas is a 89 y.o. female with a PMH of glaucoma and recently diagnosed CHF who presents with shortness of breath that has been worsening over the last 6 weeks. Patient states that she has been having worsening shortness of breath but has gotten worse the last few days where she is unable to lay flat to sleep and needs to prop her head up on pillows. States that she was having worsening symptoms with exertion as well. Recently was diagnosed with heart murmur per son-in-law, no history of heart failure. No chest pain, fever, chills, nausea, vomiting, diarrhea, constipation, abdominal pain, congestion, cough. Feels that she has a little bit of lower extremity swelling as well. Does not have any history of DVT/PE. No recent travel.     Current Precautions:  Fall    Allergies:  No known food allergies    Past medical history:  Please see H&P for details    Special Studies:  CXR:  Acute segmental and subsegmental pulmonary embolus in both upper lobes with nothing to  indicate right heart strain.     Groundglass opacity and septal thickening due to pulmonary edema.     Occlusion of the right middle lobe bronchus with complete right middle lobe collapse, question due to benign mucous plugging versus malignancy.     Pulmonary artery enlargement which can be a normal variant or seen with pulmonary hypertension.       Social/Education/Vocational Hx:  Pt lives with family    Swallow Information   Current Risks for Dysphagia & Aspiration: known history of dysphagia  Current Symptoms/Concerns:  pill dysphagia  Current Diet: soft/level 3 diet and thin liquids   Baseline Diet: regular diet and thin liquids      Baseline Assessment   Behavior/Cognition: alert  Speech/Language Status: able to participate in basic conversation  Patient Positioning: upright in recliner  Pain Status/Interventions/Response to Interventions:   No report of or nonverbal indications of pain.       Swallow Mechanism Exam  Facial: symmetrical  Labial: WFL  Lingual: WFL  Velum: symmetrical  Mandible: adequate ROM  Dentition: adequate  Vocal quality:clear/adequate   Volitional Cough: strong/productive   Respiratory Status: on RA      Consistencies Assessed and Performance   Consistencies Administered: thin liquids and soft solids    Oral Stage: WFL  Mastication was adequate with the materials administered today.  Bolus formation and transfer were functional with no significant oral residue noted.  No overt s/s reduced oral control.    Pharyngeal Stage: WFL  Swallow Mechanics:  Swallowing initiation appeared prompt.  Laryngeal rise was palpated and judged to be within functional limits.  No coughing, throat clearing, change in vocal quality or respiratory status noted today.     Esophageal Concerns: none reported    Summary and Recommendations (see above)    Results Reviewed with: patient     Treatment Recommended: None at this time    Elisa Graff MS CCC-SLP  5/21/2024          refusing blood draw to recheck platelets after requesting this because she only wants to be stuck with a butterfly needle that is a 25 gauge or smaller which we do not have access to.  Chronic thrombus noted.  No acute DVT to UE.  No nirmal pathology noted to shoulder refusing blood draw to recheck platelets after requesting this because she only wants to be stuck with a butterfly needle that is a 25 gauge or smaller which we do not have access to.  Chronic thrombus noted.  No acute DVT to UE.  No nirmal pathology noted to shoulder.  Advised possible neurology follow up for neuropathic pain

## 2024-05-21 NOTE — MALNUTRITION/BMI
This medical record reflects one or more clinical indicators suggestive of malnutrition.    Malnutrition Findings:   Adult Malnutrition type: Chronic illness  Adult Degree of Malnutrition: Other severe protein calorie malnutrition  Malnutrition Characteristics: Muscle loss, Fat loss                  360 Statement: severe protein calorie malnutrition in setting of chronic illness, inadequate intake, evidenced by fat loss/hollowing orbitals, muscle wasting/protruding clavicles, hollowing temples, treated with diet as tolerated. PT is refusing supplements    BMI Findings:           Body mass index is 18.43 kg/m².     See Nutrition note dated 5/21/24 for additional details.  Completed nutrition assessment is viewable in the nutrition documentation.

## 2024-05-21 NOTE — ASSESSMENT & PLAN NOTE
Presented to ED with shortness of breath for the last 6 weeks but has been worsening recently. Patient now unable to lay flat when sleeping and needed to prop head up on pillows. Recently diagnosed with a heart murmur outpatient.   CXR: Nodular fullness right infrahilar region with associated right middle lobe atelectasis, better depicted on subsequent CT study. See corresponding report of CT chest also on this date. Bronchoscopy is advised when clinically feasible. Cardiomegaly with mild interstitial edema.  EKG sinus rhythm HR 98 bpm with PAC and PVCs  D-dimer elevated at 2.54  Chest CTA pe study: Acute segmental and subsegmental pulmonary embolus in both upper lobes with nothing to indicate right heart strain. Groundglass opacity and septal thickening due to pulmonary edema. Occlusion of the right middle lobe bronchus with complete right middle lobe collapse, question due to benign mucous plugging versus malignancy. Pulmonary artery enlargement which can be a normal variant or seen with pulmonary hypertension.  LE Duplex US without evidence of DVT  Echo: Left ventricle is normal in size and function. Estimated LVEF 70%. Mild to moderate concentric left ventricular hypertrophy. No regional wall motion abnormality. RVSP is 55 to 60 mmHg. The inferior vena cava is dilated   Troponin 0hr 10, continue to trend    Heparin drip started  Will need to send eliquis to the pharmacy if patient is determined to not be high risk however will need formal PT/OT eval to determine what AC to send patient home on pending fall risk, if high risk would recommend coumadin for AC.   Discussion had with son in law at bedside regarding risks vs benefits of AC, understands that bleeding is risk of AC and agreeable to starting heparin gtt with transition to oral AC once we know what her fall risk will be. Understands that if patient has cut or falls, should be re-evaluated in the ED.   Of note, patient is jehovah witness, no blood  products if she does have decrease in hgb  Pain management  Supportive Care  Consult pulmonology

## 2024-05-21 NOTE — DISCHARGE INSTR - AVS FIRST PAGE
Dear Coco Ornelas,     It was our pleasure to care for you here at [facility]. For follow up as well as any medication refills, we recommend that you follow up with your primary care physician. Here are the most important instructions/ recommendations at discharge:     Notable Medication Adjustments -   Start taking coumadin 5mg daily   Start lovenox 40mg daily until INR between 2-3  Testing Required after Discharge -   Monitor INR every other day until between 2-3  Important follow up information -   Follow up with PCP in 1 week  Follow up with pulmonology outpatient  Other Instructions -   Continue taking anticoagulation as prescribed. It is important to inform any family if you have any new falls or cuts. You should be evaluated in the ED if you have any new falls. You are on a blood thinner and this increases your chances at bleeding and can take longer for bleeding to stop.   Once INR between 2-3, can stop lovenox and continue coumadin. Should be on coumadin lifelong  If you have any new or worsening symptoms, please return to the ED for evaluation.   Please review this entire after visit summary as additional general instructions including medication list, appointments, activity, diet, any pertinent wound care, and other additional recommendations from your care team that may be provided for you.      Sincerely,     Yumiko Walker PA-C

## 2024-05-21 NOTE — UTILIZATION REVIEW
Initial Clinical Review    Admission: Date/Time/Statement:   Admission Orders (From admission, onward)       Ordered        05/20/24 1657  INPATIENT ADMISSION  Once                          Orders Placed This Encounter   Procedures    INPATIENT ADMISSION     Standing Status:   Standing     Number of Occurrences:   1     Order Specific Question:   Level of Care     Answer:   Med Surg [16]     Order Specific Question:   Estimated length of stay     Answer:   More than 2 Midnights     Order Specific Question:   Certification     Answer:   I certify that inpatient services are medically necessary for this patient for a duration of greater than two midnights. See H&P and MD Progress Notes for additional information about the patient's course of treatment.     ED Arrival Information       Expected   5/20/2024     Arrival   5/20/2024 14:20    Acuity   Urgent              Means of arrival   Walk-In    Escorted by   Family Member    Service   Hospitalist    Admission type   Emergency              Arrival complaint   shortness of breath             Chief Complaint   Patient presents with    Shortness of Breath     Sent from  for further evaluation of worsening SOB over the past 4-6 weeks with bilateral leg swelling.        Initial Presentation: 89 y.o. female to ED via walk-in from home  Present to ED with worsening shortness of breath. Endorses she is unable to lay flat to sleep and needs to prop her head up on pillows and worsening symptoms with exertion as well.   PMHX: glaucoma and recently diagnosed CHF   Admitted to [facility] with DX: Acute pulmonary embolism   on exam: hypertensive; tachy; tachypnea; RA sat 90% - O2 placed @ 2L via nc sat 97% (Does not wear o2 at baseline); lungs coarse bilat; B/L LE edema; -dimer elevated at 2.54;   Chest CTA pe study: Acute segmental and subsegmental pulmonary embolus in both upper lobes with nothing to indicate right heart strain. Groundglass opacity and septal thickening due to  pulmonary edema. Occlusion of the right middle lobe bronchus with complete right middle lobe collapse, question due to benign mucous plugging versus malignancy. Pulmonary artery enlargement which can be a normal variant or seen with pulmonary hypertension.  PLAN: cont heparin gtt;f/u echo; monitor labs; Cardiopulmonary monitoring; f/u LE duplex U/S; consult pulmonary      Anticipated Length of Stay/Certification Statement: Patient will be admitted on an inpatient basis with an anticipated length of stay of greater than 2 midnights secondary to acute pulmonary embolism.       Date: 5/21/24      Day 2  Feeling much better today. Improved lung sounds today, will order another dose of lasix 20 mg x 1 due to IVC dilation on echo. Speech: soft/level 3 diet and thin liquids; meds whole with puree. Exam: O2 @ 1 L via nc; I/O Net -930; lungs with decreased breath sounds and crackles. B/L LE edema  Will need to send eliquis to the pharmacy if patient is determined to not be high risk however will need formal PT/OT eval to determine what AC to send patient home on pending fall risk, if high risk would recommend coumadin for AC.  LE Duplex US without evidence of DVT; Echo with IVC dilation. EF 70%.   Plan: cont heparin gtt; Rec'd lasix iv x1; monitor labs; Cardiopulmonary monitoring; Will need home o2 eval prior to discharge. titrate O2      PULMONOLOGY CONSULT   Acute hypoxic respiratory failure.  Acute pulmonary embolism.  Likely provoked by immobilization/inactivity due to deconditioning/fragility.  No recent fall, trauma, surgery, procedure or long travel.  Negative family history of VTE at young age.  Frailty/deconditioning.  RML collapse-likely retained secretions/hypoventilation from deconditioning.  Hx diastolic CHF-noted moderate hypertrophy of LV on TTE.  Pulmonary hypertension-RVSP 55 to 60 mmHg, suspect from both diastolic CHF and acute PE  Plan: Continue unfractionated heparin       Date: 5/22/24      Day 3: Has  surpassed a 2nd midnight with active treatments and services. Require additional inpatient hospital stay due to requiring iv heparin as well as starting coumadin for pe    Patient states that she is feeling well. Denies chest pain and shortness of breath. Currently saturating around 96% on 0.5L NC, found to be tachypneic and requiring o2. Does not wear o2 at baseline. Exam: I/O net -520  Started patient on coumadin due to high fall risk   Plan: cont heparin gtt; monitor labs; Cardiopulmonary monitoring; Will need home o2 eval prior to discharge. titrate O2          ED Triage Vitals   Temperature Pulse Respirations Blood Pressure SpO2   05/20/24 1430 05/20/24 1430 05/20/24 1430 05/20/24 1430 05/20/24 1430   98.1 °F (36.7 °C) 105 (!) 28 170/82 90 %      Temp Source Heart Rate Source Patient Position - Orthostatic VS BP Location FiO2 (%)   05/20/24 1817 05/20/24 1430 05/20/24 1430 05/20/24 1430 --   Temporal Monitor Lying Left arm       Pain Score       05/20/24 1430       No Pain          Wt Readings from Last 1 Encounters:   05/22/24 42 kg (92 lb 9.6 oz)     Additional Vital Signs:   Date/Time Temp Pulse Resp BP MAP (mmHg) SpO2 Calculated FIO2 (%) - Nasal Cannula Nasal Cannula O2 Flow Rate (L/min) O2 Device Patient Position - Orthostatic VS   05/22/24 14:40:35 97.7 °F (36.5 °C) 99 18 142/76 98 97 % -- -- -- --   05/22/24 11:17:20 97.8 °F (36.6 °C) 102 18 145/78 100 93 % -- -- -- --   05/22/24 07:41:43 -- -- 18 136/78 97 -- -- -- -- --   05/22/24 0722 -- -- -- -- -- -- 22 0.5 L/min Nasal cannula --   05/22/24 03:37:15 97.9 °F (36.6 °C) 82 -- 136/78 97 95 % -- -- -- --   05/22/24 0136 -- -- -- -- -- 93 % 22 0.5 L/min Nasal cannula --   05/21/24 2300 98.1 °F (36.7 °C) -- 18 163/78 106 92 % -- -- None (Room air) Lying   05/21/24 2028 -- -- -- -- -- 92 % -- -- None (Room air) --   05/21/24 1900 98.2 °F (36.8 °C) -- 17 167/78 108 -- 22 0.5 L/min Nasal cannula Lying   05/21/24 15:44:46 98.6 °F (37 °C) 106 Abnormal  18  153/75 101 93 % -- -- None (Room air) --   05/21/24 1540 -- -- -- -- -- 95 % 22 0.5 L/min Nasal cannula      Date/Time Temp Pulse Resp BP MAP (mmHg) SpO2 Calculated FIO2 (%) - Nasal Cannula Nasal Cannula O2 Flow Rate (L/min) O2 Device Patient Position - Orthostatic VS   05/21/24 11:16:30 98.2 °F (36.8 °C) 95 20 152/76 101 97 % -- -- -- --   05/21/24 0800 -- -- -- -- -- 100 % 28 2 L/min Nasal cannula --   05/21/24 07:44:32 98.1 °F (36.7 °C) 89 16 153/77 102 100 % -- -- -- --   05/21/24 0702 -- 88 -- -- -- 98 % -- -- -- --   05/21/24 0600 -- -- -- 155/77 -- -- -- -- -- --   05/21/24 03:12:59 97.9 °F (36.6 °C) 89 -- 155/77 103 96 % -- -- -- --   05/20/24 23:42:49 98.1 °F (36.7 °C) 86 -- 155/74 101 96 % -- -- -- --   05/20/24 2100 -- -- -- -- -- -- 28 2 L/min Nasal cannula --   05/20/24 1834 -- -- -- -- -- 97 % 28 2 L/min Nasal cannula --   05/20/24 18:17:39 97 °F (36.1 °C) Abnormal  101 18 166/92 117 97 % 28 2 L/min Nasal cannula Lying   05/20/24 1504 -- -- -- -- -- -- -- -- Nasal cannula --   05/20/24 1430 98.1 °F (36.7 °C) 105 28 Abnormal  170/82 -- 90 % -- -- None (Room air) Lying       EKG: Sinus rhythm with Premature atrial complexes  and PVC  No ST-segment abnormality.  Abnormal ECG  No previous ECGs available      Pertinent Labs/Diagnostic Test Results:    VAS VENOUS DUPLEX - LOWER LIMB BILATERAL   Final Result by Carlos Adams MD (05/21 5400)      CTA ED chest PE study   Final Result by Sallie Allison MD (05/20 7184)      Acute segmental and subsegmental pulmonary embolus in both upper lobes with nothing to indicate right heart strain.      Groundglass opacity and septal thickening due to pulmonary edema.      Occlusion of the right middle lobe bronchus with complete right middle lobe collapse, question due to benign mucous plugging versus malignancy.      Pulmonary artery enlargement which can be a normal variant or seen with pulmonary hypertension.         I personally discussed this study with   SEU PALOMARES on 5/20/2024 4:56 PM.                  Workstation performed: [id number]           Results from last 7 days   Lab Units 05/20/24  1439   SARS-COV-2  Negative     Results from last 7 days   Lab Units 05/22/24  0538 05/21/24  1207 05/20/24  1439   WBC Thousand/uL 6.80 8.11 7.51   HEMOGLOBIN g/dL 11.3* 12.3 12.8   HEMATOCRIT % 34.6* 38.1 40.4   PLATELETS Thousands/uL 281 307 305   TOTAL NEUT ABS Thousands/µL  --   --  4.68        Results from last 7 days   Lab Units 05/22/24  0538 05/21/24  1207 05/20/24  1539 05/20/24  1439   SODIUM mmol/L 139 137 136  --    POTASSIUM mmol/L 3.7 3.7 5.0  --    CHLORIDE mmol/L 104 102 106  --    CO2 mmol/L 29 27 25  --    ANION GAP mmol/L 6 8 5  --    BUN mg/dL 17 18 15  --    CREATININE mg/dL 0.80 0.77 0.70  --    EGFR ml/min/1.73sq m 62 65 73  --    CALCIUM mg/dL 8.4 8.2* 8.5  --    MAGNESIUM mg/dL 2.1 1.9  --  2.5     Results from last 7 days   Lab Units 05/20/24  1539   AST U/L 30   ALT U/L 10   ALK PHOS U/L 57   TOTAL PROTEIN g/dL 6.9   ALBUMIN g/dL 3.1*   TOTAL BILIRUBIN mg/dL 0.75        Results from last 7 days   Lab Units 05/22/24  0538 05/21/24  1207 05/20/24  1539   GLUCOSE RANDOM mg/dL 106 119 91        Results from last 7 days   Lab Units 05/20/24  2110 05/20/24  1857 05/20/24  1439   HS TNI 0HR ng/L  --   --  10   HS TNI 2HR ng/L  --  14  --    HSTNI D2 ng/L  --  4  --    HS TNI 4HR ng/L 13  --   --    HSTNI D4 ng/L 3  --   --      Results from last 7 days   Lab Units 05/20/24  1439   D-DIMER QUANTITATIVE ug/ml FEU 2.54*     Results from last 7 days   Lab Units 05/22/24  1224 05/22/24  0538 05/21/24  1536 05/20/24  2340 05/20/24  1439   PROTIME seconds 14.2  --   --   --  14.2   INR  1.07  --   --   --  1.07   PTT seconds 63* 101* 63*   < > 26    < > = values in this interval not displayed.     Results from last 7 days   Lab Units 05/20/24  1539   TSH 3RD GENERATON uIU/mL 2.908        Results from last 7 days   Lab Units 05/20/24  1439   LACTIC ACID mmol/L  1.2        Results from last 7 days   Lab Units 05/20/24  1439   BNP pg/mL 336*        Results from last 7 days   Lab Units 05/20/24  1439   INFLUENZA A PCR  Negative   INFLUENZA B PCR  Negative   RSV PCR  Negative        Results from last 7 days   Lab Units 05/20/24  1536 05/20/24  1439   BLOOD CULTURE  No Growth at 24 hrs. No Growth at 24 hrs.        ED Treatment:   Medication Administration from 05/20/2024 1352 to 05/20/2024 1753         Date/Time Order Dose Route Action     05/20/2024 1627 EDT iohexol (OMNIPAQUE) 350 MG/ML injection (MULTI-DOSE) 80 mL 80 mL Intravenous Given     05/20/2024 1708 EDT heparin (porcine) injection 3,200 Units 3,200 Units Intravenous Given     05/20/2024 1709 EDT heparin (porcine) 25,000 units in 0.45% NaCl 250 mL infusion (premix) 18 Units/kg/hr Intravenous New Bag            Admitting Diagnosis: SOB (shortness of breath) [R06.02]  Hypoxia [R09.02]  Acute respiratory failure with hypoxia (HCC) [J96.01]  Multiple subsegmental pulmonary emboli without acute cor pulmonale (HCC) [I26.94]    Age/Sex: 89 y.o. female    Admission Orders: SCDs; tele monitoring; I/O; Daily wts; dysphagia diet    Scheduled Medications:  docusate sodium, 100 mg, Oral, BID  furosemide, 20 mg, Oral, Once      Continuous IV Infusions:  heparin (porcine), 3-30 Units/kg/hr (Order-Specific), Intravenous, Titrated      PRN Meds:  acetaminophen, 650 mg, Oral, Q4H PRN  heparin (porcine), 1,600 Units, Intravenous, Q6H PRN  heparin (porcine), 3,200 Units, Intravenous, Q6H PRN  simethicone, 80 mg, Oral, 4x Daily PRN  trimethobenzamide, 200 mg, Intramuscular, Q6H PRN        IP CONSULT TO PULMONOLOGY    Network Utilization Review Department  ATTENTION: Please call with any questions or concerns to [phone] and carefully listen to the prompts so that you are directed to the right person. All voicemails are confidential.   For Discharge needs, contact Care Management DC Support Team at [phone] opt. 2  Send all  requests for admission clinical reviews, approved or denied determinations and any other requests to dedicated fax number below belonging to the campus where the patient is receiving treatment. List of dedicated fax numbers for the Facilities:  FACILITY NAME UR FAX NUMBER   ADMISSION DENIALS (Administrative/Medical Necessity) [phone]   DISCHARGE SUPPORT TEAM (NETWORK) [phone]   PARENT CHILD HEALTH (Maternity/NICU/Pediatrics) [phone]   [facility] [phone]   [facility] [phone]   [facility] [phone]   [facility] [phone]   [facility] [phone]   [facility] [phone]   [facility] [phone]   [facility] [phone]   [facility] [phone]   [facility] [phone]   [facility] [phone]   [facility] [phone]

## 2024-05-21 NOTE — ASSESSMENT & PLAN NOTE
Currently saturating around 96% on 2L NC, found to be tachypneic and requiring o2. Does not wear o2 at baseline.  Suspect secondary to pulmonary embolism  Echo with IVC dilation. EF 70%.   Pulm consulted  Does have some lower extremity swelling and concern for pulmonary edema, will order dose of lasix 20 mg po x 1.   Improved lung sounds today, will order another dose of lasix 20 mg x 1 due to IVC dilation on echo.   Wean off oxygen to keep o2 saturations >89%.   Will need home o2 eval prior to discharge.

## 2024-05-21 NOTE — CONSULTS
Pulmonary Medicine-Consultation  Coco Ornelas 89 y.o. female MRN: [id number]  Unit/Bed#: -01 Encounter: [id number]      Assessment:  Acute hypoxic respiratory failure  Acute pulmonary embolism  Likely provoked by immobilization/inactivity due to deconditioning/fragility  No recent fall, trauma, surgery, procedure or long travel  Negative family history of VTE at young age  Frailty/deconditioning  RML collapse-likely retained secretions/hypoventilation from deconditioning  Hx diastolic CHF-noted moderate hypertrophy of LV on TTE  Pulmonary hypertension-RVSP 55 to 60 mmHg, suspect from both diastolic CHF and acute PE    Recommendations/discussion:  Continue unfractionated heparin  Recommend formal PT OT evaluation, if considered a high risk for falls would consider warfarin as an oral agent, which is easier to reverse if had a bleeding  At least needs 3 to 6 months of anticoagulation therapy  If persistent deconditioning/inactivity would recommend a lifelong therapy    Discussed with the primary team via TT    Physician Requesting Consult: Jesús Win, DO    Reason for Consult / Principal Problem: Acute pulmonary embolism    HPI:   89 y.o. female with a h/o d CHF presented to the ED 5/20 with shortness of breath, acute hypoxic respiratory failure.  Family reported worse exercise capacity/progressive dyspnea over the past few weeks.  Reports prolonged.  Of inactivity, at baseline able to be walking and doing some house chores however because of worse dyspnea was sitting still for longer hours.  No recent travel, trauma, surgery or procedures.  No syncope, presyncope, angina or diaphoresis.  CT PE showed segmental/subsegmental PE at the upper lobes, GGO/septal thickening and RML atelectasis, likely retained secretions.  BNP was elevated 336, negative troponin x 3.  TTE showed severe MR, moderately elevated RVSP at 55 to 60 mmHg.  EF 70%, moderate concentric LV hypertrophy.  LE duplex without DVT.  Admitted  to [facility], started on unfractionated heparin.    On my assessment, patient is sitting in chair, very hard of hearing son-in-law is at bedside.  Reports no prior knowledge of VTE, negative family history of VTE at young age.  Has been less active over the past few months more than usual due to deconditioning and fatigue.  She feels better, no dyspnea at rest, chest pain or tightness asking when to go home.    Inpatient consult to Pulmonology  Consult performed by: Renan Cutler MD  Consult ordered by: Cayla Marmolejo PA-C          Review of Systems  As per hpi, all other systems reviewed and were negative      Studies:    Imaging Studies: I have personally reviewed pertinent films in PACS    EKG, Pathology, and Other Studies: I have personally reviewed pertinent films in PACS    Pulmonary Results (PFTs, PSG): None in file    Historical Information   Past Medical History:   Diagnosis Date    Diarrhea     Glaucoma     Macular degeneration      Past Surgical History:   Procedure Laterality Date    NO PAST SURGERIES       Social History   Social History     Substance and Sexual Activity   Alcohol Use Never     Social History     Substance and Sexual Activity   Drug Use Never     Social History     Tobacco Use   Smoking Status Former    Types: Cigarettes   Smokeless Tobacco Never     E-Cigarette/Vaping    E-Cigarette Use Never User      E-Cigarette/Vaping Substances         Family History: History reviewed. No pertinent family history.    Meds/Allergies   all current active meds have been reviewed    No Known Allergies    Objective   Vitals: Blood pressure 152/76, pulse 95, temperature 98.2 °F (36.8 °C), resp. rate 20, height 5' (1.524 m), weight 42.8 kg (94 lb 5.7 oz), SpO2 97%.,Body mass index is 18.43 kg/m².    Intake/Output Summary (Last 24 hours) at 5/21/2024 1437  Last data filed at 5/21/2024 1055  Gross per 24 hour   Intake 720 ml   Output 1650 ml   Net -930 ml     Invasive Devices        "Peripheral Intravenous Line  Duration             Peripheral IV 05/20/24 Right Wrist 1 day                    Physical Exam  Body mass index is 18.43 kg/m².   Gen: not in acute distress, thin, frail, ill-appearing but nontoxic  Neck/Eyes: supple, PERRL  Ear: normal appearance, + significant hearing impairment  Nose:  normal nasal mucosa, no drainage  Chest: normal respiratory efforts, diminished but clear breath sounds bilaterally  CV:  no murmurs appreciated, no edema  Abdomen: soft, non tender  Extremities:  No observed deformity or calf tenderness  Skin: unremarkable  Neuro: AAO X3, no focal motor deficit       Lab Results: I have personally reviewed pertinent lab results.          None    Portions of the record may have been created with voice recognition software.  Occasional wrong word or \"sound a like\" substitutions may have occurred due to the inherent limitations of voice recognition software.  Read the chart carefully and recognize, using context, where substitutions have occurred.    "

## 2024-05-21 NOTE — ASSESSMENT & PLAN NOTE
Endorses some difficulty with swallowing, states that she has hard time intermittently with pills. Has not had any EGD done recently. Discussed and would not want further workup for this  Will have speech evaluate patient, but hold on GI consult at this time.   Will place on dysphagia diet  Speech: soft/level 3 diet and thin liquids; meds whole with puree.

## 2024-05-21 NOTE — CASE MANAGEMENT
Case Management Assessment & Discharge Planning Note    Patient name Coco Ornelas  Location /-01 MRN [id number]  : 1928 Date 2024       Current Admission Date: 2024  Current Admission Diagnosis:Acute pulmonary embolism (HCC)   Patient Active Problem List    Diagnosis Date Noted Date Diagnosed    Acute pulmonary embolism (HCC) 2024     Acute respiratory failure (HCC) 2024     Dysphagia 2024     Glaucoma        LOS (days): 1  Geometric Mean LOS (GMLOS) (days): 4  Days to GMLOS:3     OBJECTIVE:    Risk of Unplanned Readmission Score: 7.94         Current admission status: Inpatient       Preferred Pharmacy:   [id number] - [place], [place] - [address] [place] [address]  Phone: [phone] Fax: [phone]    Primary Care Provider: No primary care provider on file.    Primary Insurance: [facility]  Secondary Insurance:     ASSESSMENT:  Active Health Care Proxies    There are no active Health Care Proxies on file.       Advance Directives  Does patient have a Health Care POA?: Yes (shakeel GILLIAM)  Does patient have Advance Directives?: Yes  Advance Directives: Power of  for finance, Power of  for health care  Primary Contact: Shakeel GILLIAM              Patient Information  Admitted from:: Home  Mental Status: Alert  During Assessment patient was accompanied by: Not accompanied during assessment  Assessment information provided by:: Patient  Primary Caregiver: Self  Support Systems: Family members  [facility] Residence: [facility]  Type of Current Residence: [address] [place] [address] home  Upon entering residence, is there a bedroom on the main floor (no further steps)?: Yes  Living Arrangements: Lives Alone  Is patient a ?: No    Activities of Daily Living Prior to Admission  Functional Status: Independent  Completes ADLs independently?: Yes  Ambulates independently?: Yes  Does patient use assisted devices?: No  Does  patient currently own DME?: No  Does patient have a history of Outpatient Therapy (PT/OT)?: No  Does the patient have a history of Short-Term Rehab?: No  Does patient have a history of HHC?: No  Does patient currently have HHC?: No         Patient Information Continued  Does patient have prescription coverage?: Yes  Does patient receive dialysis treatments?: No  Does patient have a history of substance abuse?: No  Does patient have a history of Mental Health Diagnosis?: No         Means of Transportation  [facility]      Social Determinants of Health (SDOH)      Flowsheet Row Most Recent Value   Housing Stability    In the last 12 months, was there a time when you were not able to pay the mortgage or rent on time? N   In the past 12 months, how many times have you moved where you were living? 1   At any time in the past 12 months, were you homeless or living in a shelter (including now)? N   Transportation Needs    In the past 12 months, has lack of transportation kept you from medical appointments or from getting medications? no   In the past 12 months, has lack of transportation kept you from meetings, work, or from getting things needed for daily living? No   Food Insecurity    Within the past 12 months, you worried that your food would run out before you got the money to buy more. Never true   Within the past 12 months, the food you bought just didn't last and you didn't have money to get more. Never true   Utilities    In the past 12 months has the electric, gas, oil, or water company threatened to shut off services in your home? No            DISCHARGE DETAILS:    Discharge planning discussed with:: patient  Freedom of Choice: Yes                   Contacts  Patient Contacts: Shakeel GILLIAM  Relationship to Patient:: Family                   Would you like to participate in our [facility] service program?  : No - Declined                    Pt is from home, lives alone.   "Pt sts she has \"many visitors every day.\"  Pts MANE, Walker is pts POA. Pt sts he visits regularly.  Pt sts her \"friends and family help her every day\", pt sts \"there is a lot of love in my home and I want to go back there as soon as I can.\"  Pt sts she is ambulatory without AD.  Pt sts she drove up until 2019, now relies on family/friends for transport.  Pt does not use O2 at baseline.    Pt has a 2SH, sts she has a first floor set up.                                                    "

## 2024-05-21 NOTE — PROGRESS NOTES
[facility]  Progress Note  Name: Coco Ornelas I  MRN: [id number]  Unit/Bed#: -01 I Date of Admission: 5/20/2024   Date of Service: 5/21/2024 I Hospital Day: 1    Assessment & Plan   * Acute pulmonary embolism (HCC)  Assessment & Plan  Presented to ED with shortness of breath for the last 6 weeks but has been worsening recently. Patient now unable to lay flat when sleeping and needed to prop head up on pillows. Recently diagnosed with a heart murmur outpatient.   CXR: Nodular fullness right infrahilar region with associated right middle lobe atelectasis, better depicted on subsequent CT study. See corresponding report of CT chest also on this date. Bronchoscopy is advised when clinically feasible. Cardiomegaly with mild interstitial edema.  EKG sinus rhythm HR 98 bpm with PAC and PVCs  D-dimer elevated at 2.54  Chest CTA pe study: Acute segmental and subsegmental pulmonary embolus in both upper lobes with nothing to indicate right heart strain. Groundglass opacity and septal thickening due to pulmonary edema. Occlusion of the right middle lobe bronchus with complete right middle lobe collapse, question due to benign mucous plugging versus malignancy. Pulmonary artery enlargement which can be a normal variant or seen with pulmonary hypertension.  LE Duplex US without evidence of DVT  Echo: Left ventricle is normal in size and function. Estimated LVEF 70%. Mild to moderate concentric left ventricular hypertrophy. No regional wall motion abnormality. RVSP is 55 to 60 mmHg. The inferior vena cava is dilated   Troponin 0hr 10, continue to trend    Heparin drip started  Will need to send eliquis to the pharmacy if patient is determined to not be high risk however will need formal PT/OT eval to determine what AC to send patient home on pending fall risk, if high risk would recommend coumadin for AC.   Discussion had with son in law at bedside regarding risks vs benefits of AC,  understands that bleeding is risk of AC and agreeable to starting heparin gtt with transition to oral AC once we know what her fall risk will be. Understands that if patient has cut or falls, should be re-evaluated in the ED.   Of note, patient is jehovah witness, no blood products if she does have decrease in hgb  Pain management  Supportive Care  Consult pulmonology    Acute respiratory failure (HCC)  Assessment & Plan  Currently saturating around 96% on 2L NC, found to be tachypneic and requiring o2. Does not wear o2 at baseline.  Suspect secondary to pulmonary embolism  Echo with IVC dilation. EF 70%.   Pulm consulted  Does have some lower extremity swelling and concern for pulmonary edema, will order dose of lasix 20 mg po x 1.   Improved lung sounds today, will order another dose of lasix 20 mg x 1 due to IVC dilation on echo.   Wean off oxygen to keep o2 saturations >89%.   Will need home o2 eval prior to discharge.     Dysphagia  Assessment & Plan  Endorses some difficulty with swallowing, states that she has hard time intermittently with pills. Has not had any EGD done recently. Discussed and would not want further workup for this  Will have speech evaluate patient, but hold on GI consult at this time.   Will place on dysphagia diet  Speech: soft/level 3 diet and thin liquids; meds whole with puree.     Glaucoma  Assessment & Plan  Currently taking using eye drops daily  Continue outpatient ophthalmology follow up  Son-in-law to bring in eye drops.            VTE Pharmacologic Prophylaxis: VTE Score: 6 High Risk (Score >/= 5) - Pharmacological DVT Prophylaxis Ordered: heparin drip. Sequential Compression Devices Ordered.    Mobility:   Basic Mobility Inpatient Raw Score: 18  JH-HLM Goal: 6: Walk 10 steps or more  JH-HLM Achieved: 6: Walk 10 steps or more  JH-HLM Goal achieved. Continue to encourage appropriate mobility.    Patient Centered Rounds: I performed bedside rounds with nursing staff today.    Discussions with Specialists or Other Care Team Provider: nursing, case management    Education and Discussions with Family / Patient: Updated  (son in law) at bedside.    Total Time Spent on Date of Encounter in care of patient:  mins. This time was spent on one or more of the following: performing physical exam; counseling and coordination of care; obtaining or reviewing history; documenting in the medical record; reviewing/ordering tests, medications or procedures; communicating with other healthcare professionals and discussing with patient's family/caregivers.    Current Length of Stay: 1 day(s)  Current Patient Status: Inpatient   Certification Statement: The patient will continue to require additional inpatient hospital stay due to acute respiratory failure, heparin gtt for acute pe, pulm eval  Discharge Plan: Anticipate discharge tomorrow to discharge location to be determined pending rehab evaluations.    Code Status: Level 3 - DNAR and DNI    Subjective:   Seen and examined today. Feeling much better today. She said she is lonely in the hospital and hopes to go home tomorrow. She is hopeful to get off oxygen as well. No nausea, vomiting, diarrhea, constipation, abdominal pain, CP, SOB, fever, chills currently.     Objective:     Vitals:   Temp (24hrs), Av °F (36.7 °C), Min:97 °F (36.1 °C), Max:98.6 °F (37 °C)    Temp:  [97 °F (36.1 °C)-98.6 °F (37 °C)] 98.6 °F (37 °C)  HR:  [] 106  Resp:  [16-20] 18  BP: (152-166)/(74-92) 153/75  SpO2:  [93 %-100 %] 93 %  Body mass index is 18.43 kg/m².     Input and Output Summary (last 24 hours):     Intake/Output Summary (Last 24 hours) at 2024 1701  Last data filed at 2024 1055  Gross per 24 hour   Intake 720 ml   Output 1650 ml   Net -930 ml       Physical Exam:   Physical Exam  Vitals reviewed.   Constitutional:       General: She is not in acute distress.     Appearance: She is not ill-appearing or toxic-appearing.      Comments:  Frail, pleasant   HENT:      Mouth/Throat:      Mouth: Mucous membranes are moist.   Cardiovascular:      Rate and Rhythm: Normal rate and regular rhythm.      Heart sounds: Murmur heard.   Pulmonary:      Effort: No respiratory distress.      Breath sounds: No stridor. No wheezing, rhonchi or rales.      Comments: Decreased breath sounds bilaterally with crackles appreciated. Saturating around 98% on 1L NC  Abdominal:      General: Bowel sounds are normal. There is no distension.      Palpations: Abdomen is soft. There is no mass.      Tenderness: There is no abdominal tenderness.   Musculoskeletal:      Right lower leg: Edema present.      Left lower leg: Edema present.      Comments: Trace edema   Skin:     General: Skin is warm and dry.   Neurological:      General: No focal deficit present.      Mental Status: She is alert and oriented to person, place, and time.      Comments: Hard of hearing   Psychiatric:         Mood and Affect: Mood normal.         Behavior: Behavior normal.          Additional Data:     Labs:  Results from last 7 days   Lab Units 05/21/24  1207 05/20/24  1439   WBC Thousand/uL 8.11 7.51   HEMOGLOBIN g/dL 12.3 12.8   HEMATOCRIT % 38.1 40.4   PLATELETS Thousands/uL 307 305   SEGS PCT %  --  61   LYMPHO PCT %  --  19   MONO PCT %  --  10   EOS PCT %  --  8*     Results from last 7 days   Lab Units 05/21/24  1207 05/20/24  1539   SODIUM mmol/L 137 136   POTASSIUM mmol/L 3.7 5.0   CHLORIDE mmol/L 102 106   CO2 mmol/L 27 25   BUN mg/dL 18 15   CREATININE mg/dL 0.77 0.70   ANION GAP mmol/L 8 5   CALCIUM mg/dL 8.2* 8.5   ALBUMIN g/dL  --  3.1*   TOTAL BILIRUBIN mg/dL  --  0.75   ALK PHOS U/L  --  57   ALT U/L  --  10   AST U/L  --  30   GLUCOSE RANDOM mg/dL 119 91     Results from last 7 days   Lab Units 05/20/24  1439   INR  1.07             Results from last 7 days   Lab Units 05/20/24  1439   LACTIC ACID mmol/L 1.2       Lines/Drains:  Invasive Devices       Peripheral Intravenous Line   Duration             Peripheral IV 05/20/24 Right Wrist 1 day                      Telemetry:  Telemetry Orders (From admission, onward)               24 Hour Telemetry Monitoring  Continuous x 24 Hours (Telem)        Question:  Reason for 24 Hour Telemetry  Answer:  Pulmonary Embolism                     Telemetry Reviewed: Normal Sinus Rhythm  Indication for Continued Telemetry Use: PE             Imaging: Reviewed radiology reports from this admission including: ultrasound(s) and ECHO    Recent Cultures (last 7 days):   Results from last 7 days   Lab Units 05/20/24  1536 05/20/24  1439   BLOOD CULTURE  Received in Microbiology Lab. Culture in Progress. Received in Microbiology Lab. Culture in Progress.       Last 24 Hours Medication List:   Current Facility-Administered Medications   Medication Dose Route Frequency Provider Last Rate    acetaminophen  650 mg Oral Q4H PRN PROMISE Jean-Baptiste-C      docusate sodium  100 mg Oral BID Cayla Marmolejo, PA-C      heparin (porcine)  3-30 Units/kg/hr (Order-Specific) Intravenous Titrated Cayla Marmolejo, PA-C 15 Units/kg/hr (05/21/24 0130)    heparin (porcine)  1,600 Units Intravenous Q6H PRN Cayla Marmolejo PA-C      heparin (porcine)  3,200 Units Intravenous Q6H PRN Cayla Marmolejo, PA-C      simethicone  80 mg Oral 4x Daily PRN Cayla Marmolejo PA-C      trimethobenzamide  200 mg Intramuscular Q6H PRN Cayla Marmolejo, PA-C          Today, Patient Was Seen By: Cayla Marmolejo PA-C    **Please Note: This note may have been constructed using a voice recognition system.**

## 2024-05-21 NOTE — OCCUPATIONAL THERAPY NOTE
Occupational Therapy Cancel Note      Patient Name: Coco Ornelas  Today's Date: 5/21/2024 05/21/24 1928   Note Type   Note type Evaluation;Cancelled Session   Cancel Reasons Medical status       OT order received, chart review completed. Pt admitted to [facility] on 5/20/24 w/ Dx: Acute PE. Attempted to see pt for OT session this date, however heparin drip started 17:08 yesterday evening, and PAQuiqueC requesting we hold therapy until it has been 24 hours s/p heparin drip. Will hold OT until pt has been on heparin for 24 hours. Will continue to follow pt and provide care as pt is appropriate and available.      Hieu Hernandez MS, OTR/L

## 2024-05-21 NOTE — PLAN OF CARE
Problem: SAFETY ADULT  Goal: Patient will remain free of falls  Description: INTERVENTIONS:  - Educate patient/family on patient safety including physical limitations  - Instruct patient to call for assistance with activity   - Consult OT/PT to assist with strengthening/mobility   - Keep Call bell within reach  - Keep bed low and locked with side rails adjusted as appropriate  - Keep care items and personal belongings within reach  - Initiate and maintain comfort rounds  - Make Fall Risk Sign visible to staff  - Offer Toileting every 2 Hours, in advance of need  - Initiate/Maintain alarm  - Obtain necessary fall risk management equipment: yellow socks  - Apply yellow socks and bracelet for high fall risk patients  - Consider moving patient to room near nurses station  Outcome: Progressing  Goal: Maintain or return to baseline ADL function  Description: INTERVENTIONS:  -  Assess patient's ability to carry out ADLs; assess patient's baseline for ADL function and identify physical deficits which impact ability to perform ADLs (bathing, care of mouth/teeth, toileting, grooming, dressing, etc.)  - Assess/evaluate cause of self-care deficits   - Assess range of motion  - Assess patient's mobility; develop plan if impaired  - Assess patient's need for assistive devices and provide as appropriate  - Encourage maximum independence but intervene and supervise when necessary  - Involve family in performance of ADLs  - Assess for home care needs following discharge   - Consider OT consult to assist with ADL evaluation and planning for discharge  - Provide patient education as appropriate  Outcome: Progressing  Goal: Maintains/Returns to pre admission functional level  Description: INTERVENTIONS:  - Perform AM-PAC 6 Click Basic Mobility/ Daily Activity assessment daily.  - Set and communicate daily mobility goal to care team and patient/family/caregiver.   - Collaborate with rehabilitation services on mobility goals if  consulted  - Perform Range of Motion 3 times a day.  - Dangle patient 3 times a day  - Stand patient 3 times a day  - Ambulate patient 3 times a day  - Out of bed to chair 3 times a day   - Out of bed for meals 3 times a day  - Out of bed for toileting  - Record patient progress and toleration of activity level   Outcome: Progressing

## 2024-05-22 ENCOUNTER — TELEPHONE (OUTPATIENT)
Dept: PULMONOLOGY | Facility: CLINIC | Age: 89
End: 2024-05-22

## 2024-05-22 LAB
ANION GAP SERPL CALCULATED.3IONS-SCNC: 6 MMOL/L (ref 4–13)
APTT PPP: 101 SECONDS (ref 23–37)
APTT PPP: 57 SECONDS (ref 23–37)
APTT PPP: 63 SECONDS (ref 23–37)
BUN SERPL-MCNC: 17 MG/DL (ref 5–25)
CALCIUM SERPL-MCNC: 8.4 MG/DL (ref 8.4–10.2)
CHLORIDE SERPL-SCNC: 104 MMOL/L (ref 96–108)
CO2 SERPL-SCNC: 29 MMOL/L (ref 21–32)
CREAT SERPL-MCNC: 0.8 MG/DL (ref 0.6–1.3)
ERYTHROCYTE [DISTWIDTH] IN BLOOD BY AUTOMATED COUNT: 14.1 % (ref 11.6–15.1)
GFR SERPL CREATININE-BSD FRML MDRD: 62 ML/MIN/1.73SQ M
GLUCOSE SERPL-MCNC: 106 MG/DL (ref 65–140)
HCT VFR BLD AUTO: 34.6 % (ref 34.8–46.1)
HGB BLD-MCNC: 11.3 G/DL (ref 11.5–15.4)
INR PPP: 1.07 (ref 0.84–1.19)
MAGNESIUM SERPL-MCNC: 2.1 MG/DL (ref 1.9–2.7)
MCH RBC QN AUTO: 29.7 PG (ref 26.8–34.3)
MCHC RBC AUTO-ENTMCNC: 32.7 G/DL (ref 31.4–37.4)
MCV RBC AUTO: 91 FL (ref 82–98)
PLATELET # BLD AUTO: 281 THOUSANDS/UL (ref 149–390)
PMV BLD AUTO: 9 FL (ref 8.9–12.7)
POTASSIUM SERPL-SCNC: 3.7 MMOL/L (ref 3.5–5.3)
PROTHROMBIN TIME: 14.2 SECONDS (ref 11.6–14.5)
RBC # BLD AUTO: 3.8 MILLION/UL (ref 3.81–5.12)
SODIUM SERPL-SCNC: 139 MMOL/L (ref 135–147)
WBC # BLD AUTO: 6.8 THOUSAND/UL (ref 4.31–10.16)

## 2024-05-22 PROCEDURE — 85730 THROMBOPLASTIN TIME PARTIAL: CPT | Performed by: HOSPITALIST

## 2024-05-22 PROCEDURE — 85730 THROMBOPLASTIN TIME PARTIAL: CPT

## 2024-05-22 PROCEDURE — 97163 PT EVAL HIGH COMPLEX 45 MIN: CPT

## 2024-05-22 PROCEDURE — 85027 COMPLETE CBC AUTOMATED: CPT

## 2024-05-22 PROCEDURE — 97167 OT EVAL HIGH COMPLEX 60 MIN: CPT

## 2024-05-22 PROCEDURE — 99232 SBSQ HOSP IP/OBS MODERATE 35: CPT

## 2024-05-22 PROCEDURE — 85610 PROTHROMBIN TIME: CPT

## 2024-05-22 PROCEDURE — 83735 ASSAY OF MAGNESIUM: CPT

## 2024-05-22 PROCEDURE — 97116 GAIT TRAINING THERAPY: CPT

## 2024-05-22 PROCEDURE — 99232 SBSQ HOSP IP/OBS MODERATE 35: CPT | Performed by: INTERNAL MEDICINE

## 2024-05-22 PROCEDURE — 97535 SELF CARE MNGMENT TRAINING: CPT

## 2024-05-22 PROCEDURE — 80048 BASIC METABOLIC PNL TOTAL CA: CPT

## 2024-05-22 RX ORDER — WARFARIN SODIUM 5 MG/1
5 TABLET ORAL
Status: DISCONTINUED | OUTPATIENT
Start: 2024-05-22 | End: 2024-05-23 | Stop reason: HOSPADM

## 2024-05-22 RX ADMIN — HEPARIN SODIUM 13 UNITS/KG/HR: 10000 INJECTION, SOLUTION INTRAVENOUS at 11:47

## 2024-05-22 RX ADMIN — HEPARIN SODIUM 1600 UNITS: 1000 INJECTION INTRAVENOUS; SUBCUTANEOUS at 19:38

## 2024-05-22 RX ADMIN — WARFARIN SODIUM 5 MG: 5 TABLET ORAL at 17:16

## 2024-05-22 RX ADMIN — BROMFENAC OPHTHALMIC SOLUTION 0.09% 1 DROP: 1.03 SOLUTION/ DROPS OPHTHALMIC at 08:11

## 2024-05-22 RX ADMIN — BROMFENAC OPHTHALMIC SOLUTION 0.09% 1 DROP: 1.03 SOLUTION/ DROPS OPHTHALMIC at 21:34

## 2024-05-22 NOTE — PROGRESS NOTES
[facility]  Progress Note  Name: Coco Ornelas I  MRN: [id number]  Unit/Bed#: -01 I Date of Admission: 5/20/2024   Date of Service: 5/22/2024 I Hospital Day: 2    Assessment & Plan   * Acute pulmonary embolism (HCC)  Assessment & Plan  Presented to ED with shortness of breath for the last 6 weeks but has been worsening recently. Patient now unable to lay flat when sleeping and needed to prop head up on pillows. Recently diagnosed with a heart murmur outpatient.   EKG sinus rhythm HR 98 bpm with PAC and PVCs  D-dimer elevated at 2.54  Chest CTA pe study: Acute segmental and subsegmental pulmonary embolus in both upper lobes with nothing to indicate right heart strain.   LE Duplex US without evidence of DVT  Echo: Left ventricle is normal in size and function. Estimated LVEF 70%. Mild to moderate concentric left ventricular hypertrophy. No regional wall motion abnormality. The inferior vena cava is dilated   Troponin flat    Heparin drip started  Discussion had with son in law at bedside regarding risks vs benefits of AC, understands that bleeding is risk of AC Understands that if patient has cut or falls, should be re-evaluated in the ED.   Started patient on coumadin due to high fall risk  Of note, patient is jehovah witness, no blood products if she does have decrease in hgb  Consult pulmonology    Severe protein-calorie malnutrition (HCC)  Assessment & Plan  Malnutrition Findings:   Adult Malnutrition type: Chronic illness  Adult Degree of Malnutrition: Other severe protein calorie malnutrition  Malnutrition Characteristics: Muscle loss, Fat loss                  360 Statement: severe protein calorie malnutrition in setting of chronic illness, inadequate intake, evidenced by fat loss/hollowing orbitals, muscle wasting/protruding clavicles, hollowing temples, treated with diet as tolerated. PT is refusing supplements    BMI Findings:           Body mass index is 18.08 kg/m².        Dysphagia  Assessment & Plan  Endorses some difficulty with swallowing, states that she has hard time intermittently with pills. Has not had any EGD done recently. Discussed and would not want further workup for this  Will have speech evaluate patient, but hold on GI consult at this time.   Speech: soft/level 3 diet and thin liquids; meds whole with puree.     Acute respiratory failure (HCC)  Assessment & Plan  Currently saturating around 96% on 2L NC, found to be tachypneic and requiring o2. Does not wear o2 at baseline.  Suspect secondary to pulmonary embolism  Echo with IVC dilation. EF 70%.   Pulm consulted  Does have some lower extremity swelling and concern for pulmonary edema, will order dose of lasix 20 mg po x 1.   Improved lung sounds today, will order another dose of lasix 20 mg x 1 due to IVC dilation on echo.   Wean off oxygen to keep o2 saturations >89%.   Will need home o2 eval prior to discharge.     Glaucoma  Assessment & Plan  Currently taking using eye drops daily  Continue outpatient ophthalmology follow up  Son-in-law to bring in eye drops.              VTE Pharmacologic Prophylaxis: VTE Score: 6 High Risk (Score >/= 5) - Pharmacological DVT Prophylaxis Ordered: heparin drip. Sequential Compression Devices Ordered.    Mobility:   Basic Mobility Inpatient Raw Score: 18  JH-HLM Goal: 6: Walk 10 steps or more  JH-HLM Achieved: 6: Walk 10 steps or more  JH-HLM Goal achieved. Continue to encourage appropriate mobility.    Patient Centered Rounds: I performed bedside rounds with nursing staff today.   Discussions with Specialists or Other Care Team Provider: nursing and cm    Education and Discussions with Family / Patient: Updated  (son in law) via phone.    Total Time Spent on Date of Encounter in care of patient:  mins. This time was spent on one or more of the following: performing physical exam; counseling and coordination of care; obtaining or reviewing history; documenting  in the medical record; reviewing/ordering tests, medications or procedures; communicating with other healthcare professionals and discussing with patient's family/caregivers.    Current Length of Stay: 2 day(s)  Current Patient Status: Inpatient   Certification Statement: The patient will continue to require additional inpatient hospital stay due to requiring iv heparin as well as starting coumadin for pe   Discharge Plan: Anticipate discharge in 24-48 hrs to discharge location to be determined pending rehab evaluations.    Code Status: Level 3 - DNAR and DNI    Subjective:   Patient states that she is feeling well. Denies chest pain and shortness of breath. Does not offer any further complaints at this time.     Objective:     Vitals:   Temp (24hrs), Av.1 °F (36.7 °C), Min:97.8 °F (36.6 °C), Max:98.6 °F (37 °C)    Temp:  [97.8 °F (36.6 °C)-98.6 °F (37 °C)] 97.8 °F (36.6 °C)  HR:  [] 102  Resp:  [17-18] 18  BP: (136-167)/(75-78) 145/78  SpO2:  [92 %-95 %] 93 %  Body mass index is 18.08 kg/m².     Input and Output Summary (last 24 hours):     Intake/Output Summary (Last 24 hours) at 2024 1336  Last data filed at 2024 0741  Gross per 24 hour   Intake 30 ml   Output 550 ml   Net -520 ml       Physical Exam:   Physical Exam  Vitals reviewed.   Constitutional:       General: She is not in acute distress.     Appearance: Normal appearance. She is not ill-appearing.      Comments: frail   HENT:      Head: Normocephalic and atraumatic.      Nose: Nose normal.      Mouth/Throat:      Mouth: Mucous membranes are moist.      Pharynx: Oropharynx is clear.   Eyes:      Extraocular Movements: Extraocular movements intact.      Conjunctiva/sclera: Conjunctivae normal.   Cardiovascular:      Rate and Rhythm: Normal rate and regular rhythm.      Pulses: Normal pulses.      Heart sounds: Normal heart sounds. No murmur heard.  Pulmonary:      Effort: Pulmonary effort is normal. No respiratory distress.      Breath  sounds: Normal breath sounds. No wheezing or rhonchi.   Abdominal:      General: Abdomen is flat. Bowel sounds are normal. There is no distension.      Palpations: Abdomen is soft.      Tenderness: There is no abdominal tenderness. There is no guarding.   Musculoskeletal:         General: Normal range of motion.      Cervical back: Normal range of motion.      Right lower leg: No edema.      Left lower leg: No edema.   Skin:     General: Skin is warm.   Neurological:      General: No focal deficit present.      Mental Status: She is alert. Mental status is at baseline.      Motor: No weakness.      Comments: Oriented to self, place and month. Not year   Psychiatric:         Mood and Affect: Mood normal.         Behavior: Behavior normal.         Thought Content: Thought content normal.         Judgment: Judgment normal.          Additional Data:     Labs:  Results from last 7 days   Lab Units 05/22/24  0538 05/21/24  1207 05/20/24  1439   WBC Thousand/uL 6.80   < > 7.51   HEMOGLOBIN g/dL 11.3*   < > 12.8   HEMATOCRIT % 34.6*   < > 40.4   PLATELETS Thousands/uL 281   < > 305   SEGS PCT %  --   --  61   LYMPHO PCT %  --   --  19   MONO PCT %  --   --  10   EOS PCT %  --   --  8*    < > = values in this interval not displayed.     Results from last 7 days   Lab Units 05/22/24  0538 05/21/24  1207 05/20/24  1539   SODIUM mmol/L 139   < > 136   POTASSIUM mmol/L 3.7   < > 5.0   CHLORIDE mmol/L 104   < > 106   CO2 mmol/L 29   < > 25   BUN mg/dL 17   < > 15   CREATININE mg/dL 0.80   < > 0.70   ANION GAP mmol/L 6   < > 5   CALCIUM mg/dL 8.4   < > 8.5   ALBUMIN g/dL  --   --  3.1*   TOTAL BILIRUBIN mg/dL  --   --  0.75   ALK PHOS U/L  --   --  57   ALT U/L  --   --  10   AST U/L  --   --  30   GLUCOSE RANDOM mg/dL 106   < > 91    < > = values in this interval not displayed.     Results from last 7 days   Lab Units 05/22/24  1224   INR  1.07             Results from last 7 days   Lab Units 05/20/24  1439   LACTIC ACID mmol/L  1.2       Lines/Drains:  Invasive Devices       Peripheral Intravenous Line  Duration             Peripheral IV 05/20/24 Right Wrist 1 day    Peripheral IV 05/21/24 Distal;Dorsal (posterior);Left Forearm 1 day                          Imaging: Reviewed radiology reports from this admission including: chest CT scan    Recent Cultures (last 7 days):   Results from last 7 days   Lab Units 05/20/24  1536 05/20/24  1439   BLOOD CULTURE  No Growth at 24 hrs. No Growth at 24 hrs.       Last 24 Hours Medication List:   Current Facility-Administered Medications   Medication Dose Route Frequency Provider Last Rate    acetaminophen  650 mg Oral Q4H PRN Cayla Ryan Gnall, PA-C      Bromfenac Sodium (Once-Daily)  1 drop Left Eye BID Cayla Ryan Gnall, PA-C      docusate sodium  100 mg Oral BID Cayla Ryan Gnall, PA-C      heparin (porcine)  3-30 Units/kg/hr (Order-Specific) Intravenous Titrated Cayla Ryan Gnall, PA-C 13 Units/kg/hr (05/22/24 1147)    heparin (porcine)  1,600 Units Intravenous Q6H PRN Cayla Ryan Gnall, PA-C      heparin (porcine)  3,200 Units Intravenous Q6H PRN Cayla Ryan Gnall, PA-C      simethicone  80 mg Oral 4x Daily PRN Cayla Ryan Gnall, PA-C      trimethobenzamide  200 mg Intramuscular Q6H PRN Cayla Ryan Gnall, PA-C      warfarin  5 mg Oral Daily (warfarin) Yumiko Walker PA-C          Today, Patient Was Seen By: Yumiko Walker PA-C    **Please Note: This note may have been constructed using a voice recognition system.**

## 2024-05-22 NOTE — PLAN OF CARE
Problem: PHYSICAL THERAPY ADULT  Goal: Performs mobility at highest level of function for planned discharge setting.  See evaluation for individualized goals.  Description: Treatment/Interventions: Functional transfer training, LE strengthening/ROM, Elevations, Therapeutic exercise, Endurance training, Cognitive reorientation, Patient/family training, Equipment eval/education, Bed mobility, Gait training, Compensatory technique education, Spoke to nursing, OT, Spoke to case management, Spoke to advanced practitioner          See flowsheet documentation for full assessment, interventions and recommendations.  Note: Prognosis: Fair  Problem List: Decreased strength, Decreased endurance, Impaired balance, Decreased mobility, Decreased cognition, Impaired judgement, Decreased safety awareness  Assessment: Pt is a 89 y.o. female seen for PT evaluation s/p admission to [facility] on [facility]/20/[facility]24 with Acute pulmonary embolism (HCC).  Order placed for PT services.  Upon evaluation: Pt is presenting with impaired functional mobility due to decreased strength, decreased endurance, impaired balance, gait deviations, impaired judgment, and fall risk requiring  SPV assistance for bed mobility and stand-by to min assistance for transfers and ambulation with hand held assist . Pt's clinical presentation is currently unstable/unpredictable given the functional mobility deficits above, especially decreased endurance, decreased activity tolerance, and decreased functional mobility tolerance, coupled with fall risks as indicated by AM-PAC 6-Clicks: 18/24 as well as hx of falls and combined with medical complications of abnormal CBC and need for input for mobility technique/safety.  Pt's PMHx and comorbidities that may affect physical performance and progress include:  glaucoma & macular degeneration . Personal factors affecting pt at time of IE include: anxiety, availability as recommended, advanced age, inability to  perform IADLs, limited insight into impairments, and recent fall(s)/fall history. Pt will benefit from continued skilled PT services to address deficits as defined above and to maximize level of functional mobility to facilitate return toward PLOF and improved QOL. From PT/mobility standpoint, recommendation at time of d/c would be Level II (Moderate Resource Intensity pending progress in order to reduce fall risk and maximize pt's functional independence and consistency with mobility in order to facilitate return to PLOF.  Recommend trial with walker next 1-2 sessions and ther ex next 1-2 sessions.  Barriers to Discharge: Other (Comment)  Barriers to Discharge Comments: high fall risk; lacks insight; reports of multiple falls in the past; lives alone; impaired endurance/O2 needs --> O2 line mngmnt  Rehab Resource Intensity Level, PT: II (Moderate Resource Intensity)    See flowsheet documentation for full assessment.

## 2024-05-22 NOTE — ASSESSMENT & PLAN NOTE
Endorses some difficulty with swallowing, states that she has hard time intermittently with pills. Has not had any EGD done recently. Discussed and would not want further workup for this  Will have speech evaluate patient, but hold on GI consult at this time.   Speech: soft/level 3 diet and thin liquids; meds whole with puree.

## 2024-05-22 NOTE — ASSESSMENT & PLAN NOTE
Malnutrition Findings:   Adult Malnutrition type: Chronic illness  Adult Degree of Malnutrition: Other severe protein calorie malnutrition  Malnutrition Characteristics: Muscle loss, Fat loss                  360 Statement: severe protein calorie malnutrition in setting of chronic illness, inadequate intake, evidenced by fat loss/hollowing orbitals, muscle wasting/protruding clavicles, hollowing temples, treated with diet as tolerated. PT is refusing supplements    BMI Findings:           Body mass index is 18.08 kg/m².

## 2024-05-22 NOTE — OCCUPATIONAL THERAPY NOTE
"    Occupational Therapy Evaluation     Patient Name: Coco Ornelas  Today's Date: 5/22/2024  Problem List  Principal Problem:    Acute pulmonary embolism (HCC)  Active Problems:    Glaucoma    Acute respiratory failure (HCC)    Dysphagia    Severe protein-calorie malnutrition (HCC)    Past Medical History  Past Medical History:   Diagnosis Date    Diarrhea     Glaucoma     Macular degeneration      Past Surgical History  Past Surgical History:   Procedure Laterality Date    NO PAST SURGERIES        05/22/24 1110   Note Type   Note type Evaluation   Pain Assessment   Pain Assessment Tool 0-10   Pain Score No Pain   Restrictions/Precautions   Other Precautions Chair Alarm;Bed Alarm;Fall Risk;O2  (1lpm)   Home Living   Type of Home House  (1 ERIK)   Home Layout Two level;Performs ADLs on one level;Able to live on main level with bedroom/bathroom   Bathroom Shower/Tub Tub/shower unit   Bathroom Toilet Standard   Bathroom Equipment Grab bars in shower   Additional Comments Pt reports living in a 2SH (FFSU) alone. No AD at baseline, pt reports furniture surfing.   Prior Function   Level of [place] Independent with ADLs;Independent with functional mobility;Independent with IADLS   Lives With Alone   Receives Help From   (\"every day I have someone\")   IADLs Family/Friend/Other provides transportation;Independent with meal prep;Independent with medication management   Falls in the last 6 months 0  (\"none that I consider serious\")   General   Family/Caregiver Present Yes  (friends in pt's [employer])   Subjective   Subjective \"I'm down on the ground a lot because I'm short but I'm always able to grab things to lift myself up\"   ADL   UB Dressing Assistance 5  Supervision/Setup   UB Dressing Deficit Setup;Verbal cueing;Increased time to complete   LB Dressing Assistance 4  Minimal Assistance   LB Dressing Deficit Setup;Requires assistive device for steadying;Verbal cueing;Increased time to complete   Additional Comments UB " ADLs @ S/set-up while seated at EOB. LB ADLs @ Min A d/t decreased standing balance. Pt able to don socks while seated in chair. See tx assessment for greater detail regarding ADL performance.   Bed Mobility   Supine to Sit 5  Supervision   Additional items HOB elevated;Increased time required   Additional Comments Pt supine in bed at beginning of session @95% on 1lpm. Trialed on RA. Supine to sit @ S.   Transfers   Sit to Stand   (SBA)   Additional items Increased time required;Verbal cues   Stand to Sit   (SBA)   Additional items Increased time required;Verbal cues   Stand pivot 4  Minimal assistance   Additional items Assist x 1;Increased time required;Verbal cues   Additional Comments STS from EOB without AD @ SBA. Pt unable to complete mobility without holding on to anything, Min A HHA to complete mobility from bed to bathroom. See tx assessment for greater detail regarding functional mobility.   Balance   Static Sitting Normal   Dynamic Sitting Good   Static Standing Poor +   Dynamic Standing Poor   Activity Tolerance   Activity Tolerance Patient limited by fatigue   Medical Staff Made Aware Spoke with PT Ayden   Nurse Made Aware Spoke with ANDREW Randall   RUE Assessment   RUE Assessment WFL   LUE Assessment   LUE Assessment WFL   Hand Function   Gross Motor Coordination Functional   Fine Motor Coordination Functional   Cognition   Overall Cognitive Status WFL   Arousal/Participation Alert;Responsive;Cooperative   Attention Attends with cues to redirect   Orientation Level Oriented to person;Oriented to place;Oriented to situation;Disoriented to time   Memory Decreased recall of recent events   Following Commands Follows one step commands with increased time or repetition   Comments Limited insight to deficit and safety   Assessment   Limitation Decreased ADL status;Decreased UE strength;Decreased Safe judgement during ADL;Decreased cognition;Decreased endurance;Decreased self-care trans;Decreased high-level  ADLs   Prognosis Good   Assessment Pt is a 89 y.o. female, admitted to [facility] 5/20/2024 d/t experiencing SOB. Dx: acute PE. Pt with PMHx impacting their performance during ADL tasks, including: diarrhea, glaucoma, macular degeneration. Prior to admission to the hospital Pt was performing ADLs without physical assistance. IADLs without physical assistance. Functional transfers/ambulation without physical assistance. Cognitive status was PTA was Intact. OT order placed to assess Pt's ADLs, cognitive status, and performance during functional tasks in order to maximize safety and independence while making most appropriate d/c recommendations. PT/OT co-evaluation completed at this time d/t mobility deficits and safety concerns. Pt's clinical presentation is currently unstable/unpredictable given new onset deficits that effect Pt's occupational performance and ability to safely return to PLOF including decrease activity tolerance, decrease standing balance, decrease performance during ADL tasks, decrease cognition, decrease safety awareness , decrease UB MS, decrease generalized strength, decrease activity engagement, and decrease performance during functional transfers combined with medical complications of abnormal H&H, abnormal CBC, low SpO2 values, new onset O2 use, and need for input for mobility technique/safety. Pt requiring 1lpm of O2 for mobility at this time. Personal factors affecting Pt at time of initial evaluation include: step(s) to enter environment, limited home support, advanced age, new need for AD, limited insight into impairments, recent fall(s)/fall history, and questionable non-compliance. Pt will benefit from continued skilled OT services to address deficits as defined above and to maximize level independence/participation during ADLs and functional tasks to facilitate return toward PLOF and improved quality of life. From an occupational therapy standpoint, recommendation at time of d/c would be Level  II: Moderate Resource Intensity Therapy.   Plan   Treatment Interventions ADL retraining;Functional transfer training;Visual perceptual retraining;UE strengthening/ROM;Cognitive reorientation;Endurance training;Patient/family training;Equipment evaluation/education;Neuromuscular reeducation;Compensatory technique education;Fine motor coordination activities;UE splinting;Energy conservation;Continued evaluation;Cardiac education;Activityengagement   Goal Expiration Date 06/05/24   OT Treatment Day 1   OT Frequency 3-5x/wk   Discharge Recommendation   Rehab Resource Intensity Level, OT II (Moderate Resource Intensity)   AM-PAC Daily Activity Inpatient   Lower Body Dressing 3   Bathing 3   Toileting 3   Upper Body Dressing 3   Grooming 3   Eating 4   Daily Activity Raw Score 19   Daily Activity Standardized Score (Calc for Raw Score >=11) 40.22   AM-PAC Applied Cognition Inpatient   Following a Speech/Presentation 2   Understanding Ordinary Conversation 3   Taking Medications 2   Remembering Where Things Are Placed or Put Away 3   Remembering List of 4-5 Errands 2   Taking Care of Complicated Tasks 2   Applied Cognition Raw Score 14   Applied Cognition Standardized Score 32.02   Additional Treatment Session   Start Time 1130   End Time 1142   Treatment Assessment Pt completed OT tx session #1 focused on ADL performance and functional mobility. Pt seated on toilet at beginning of session. Pt able to complete pericare while seated on toilet. STS with use of grab bars @ SBA. Pt able to complete mobility to sink with Min A HHA. Grooming task of washing hands at sink completed @ S. Pt given RW for increased safety. Pt able to complete mobility around room and in hallway with RW @ SBA. Pt O2 decreased to mid-80's on RA with activity. Pt placed back on 1lpm at end of session.   End of Consult   Patient Position at End of Consult Bedside chair;Bed/Chair alarm activated;All needs within reach     The patient's raw score on the  AM-PAC Daily Activity Inpatient Short Form is 19. A raw score of greater than or equal to 19 suggests the patient may benefit from discharge to home. Please refer to the recommendation of the Occupational Therapist for safe discharge planning.    Pt goals to be met by 6/5/2024    Pt will demonstrate ability to complete grooming/hygiene tasks @ Mod I after set-up.  Pt will demonstrate ability to complete supine<>sit @ Mod I in order to increase safety and independence during ADL tasks.  Pt will demonstrate ability to complete UB ADLs including washing/dressing @ Mod I in order to increase performance and participation during meaningful tasks  Pt will demonstrate ability to complete LB dressing @ Mod I in order to increase safety and independence during meaningful tasks.   Pt will demonstrate ability to kehinde/doff socks/shoes while sitting EOB @ Mod I in order to increase safety and independence during meaningful tasks.   Pt will demonstrate ability to complete toileting tasks including CM and pericare @ Mod I in order to increase safety and independence during meaningful tasks.  Pt will demonstrate ability to complete EOB, chair, toilet/commode transfers @ Mod I in order to increase performance and participation during functional tasks.  Pt will demonstrate ability to stand for 5-8 minutes while maintaining Fair + balance with use of LRAD for UB support PRN.  Pt will demonstrate ability to tolerate 30-35 minute OT session with no vc'ing for deep breathing or use of energy conservation techniques in order to increase activity tolerance during functional tasks.   Pt will demonstrate Good carryover of use of energy conservation/compensatory strategies during ADLs and functional tasks in order to increase safety and reduce risk for falls.   Pt will demonstrate Good attention and participation in continued evaluation of functional ambulation house hold distances in order to assist with safe d/c planning.  Pt will attend to  continued cognitive assessments 100% of the time in order to provide most appropriate d/c recommendations.   Pt will follow 100% simple 2-step commands and be A&O x4 consistently with environmental cues to increase participation in functional activities.   Pt will identify 3 areas of interest/hobbies and 1 intervention on how to incorporate into daily life in order to increase interaction with environment and peers as well as increase participation in meaningful tasks.   Pt will demonstrate 100% carryover of BUE HEP in order to increase BUE MS and increase performance during functional tasks upon d/c home.    Montse Milan, OTR/L

## 2024-05-22 NOTE — TELEPHONE ENCOUNTER
----- Message from Linda ZEE sent at 5/22/2024  3:16 PM EDT -----  Regarding: FW: HFU    ----- Message -----  From: Renan Cutler MD  Sent: 5/22/2024   1:57 PM EDT  To: [facility]  Subject: HFU                                              Pls arrange OP follow up, first available is fine/non urgen with anyone

## 2024-05-22 NOTE — TELEPHONE ENCOUNTER
"Attempted to call patient to schedule HFU in office. Would not let me leave voicemail. Asked for a \"remote acces code\" to be entered before leaving message. Unable to reach patient.   "

## 2024-05-22 NOTE — PLAN OF CARE
Problem: SAFETY ADULT  Goal: Patient will remain free of falls  Description: INTERVENTIONS:  - Educate patient/family on patient safety including physical limitations  - Instruct patient to call for assistance with activity   - Consult OT/PT to assist with strengthening/mobility   - Keep Call bell within reach  - Keep bed low and locked with side rails adjusted as appropriate  - Keep care items and personal belongings within reach  - Initiate and maintain comfort rounds  - Make Fall Risk Sign visible to staff  - Offer Toileting every 2 Hours, in advance of need  - Initiate/Maintain alarm  - Obtain necessary fall risk management equipment  - Apply yellow socks and bracelet for high fall risk patients  - Consider moving patient to room near nurses station  Outcome: Progressing  Goal: Maintain or return to baseline ADL function  Description: INTERVENTIONS:  -  Assess patient's ability to carry out ADLs; assess patient's baseline for ADL function and identify physical deficits which impact ability to perform ADLs (bathing, care of mouth/teeth, toileting, grooming, dressing, etc.)  - Assess/evaluate cause of self-care deficits   - Assess range of motion  - Assess patient's mobility; develop plan if impaired  - Assess patient's need for assistive devices and provide as appropriate  - Encourage maximum independence but intervene and supervise when necessary  - Involve family in performance of ADLs  - Assess for home care needs following discharge   - Consider OT consult to assist with ADL evaluation and planning for discharge  - Provide patient education as appropriate  Outcome: Progressing  Goal: Maintains/Returns to pre admission functional level  Description: INTERVENTIONS:  - Perform AM-PAC 6 Click Basic Mobility/ Daily Activity assessment daily.  - Set and communicate daily mobility goal to care team and patient/family/caregiver.   - Collaborate with rehabilitation services on mobility goals if consulted  - Perform  Range of Motion 3 times a day.  - Reposition patient every 2 hours.  - Dangle patient 3 times a day  - Stand patient 3 times a day  - Ambulate patient 3 times a day  - Out of bed to chair 3 times a day   - Out of bed for meals 3 times a day  - Out of bed for toileting  - Record patient progress and toleration of activity level   Outcome: Progressing     Problem: DISCHARGE PLANNING  Goal: Discharge to home or other facility with appropriate resources  Description: INTERVENTIONS:  - Identify barriers to discharge w/patient and caregiver  - Arrange for needed discharge resources and transportation as appropriate  - Identify discharge learning needs (meds, wound care, etc.)  - Arrange for interpretive services to assist at discharge as needed  - Refer to Case Management Department for coordinating discharge planning if the patient needs post-hospital services based on physician/advanced practitioner order or complex needs related to functional status, cognitive ability, or social support system  Outcome: Progressing     Problem: Knowledge Deficit  Goal: Patient/family/caregiver demonstrates understanding of disease process, treatment plan, medications, and discharge instructions  Description: Complete learning assessment and assess knowledge base.  Interventions:  - Provide teaching at level of understanding  - Provide teaching via preferred learning methods  Outcome: Progressing     Problem: Nutrition/Hydration-ADULT  Goal: Nutrient/Hydration intake appropriate for improving, restoring or maintaining nutritional needs  Description: Monitor and assess patient's nutrition/hydration status for malnutrition. Collaborate with interdisciplinary team and initiate plan and interventions as ordered.  Monitor patient's weight and dietary intake as ordered or per policy. Utilize nutrition screening tool and intervene as necessary. Determine patient's food preferences and provide high-protein, high-caloric foods as appropriate.      INTERVENTIONS:  - Monitor oral intake, urinary output, labs, and treatment plans  - Assess nutrition and hydration status and recommend course of action  - Evaluate amount of meals eaten  - Assist patient with eating if necessary   - Allow adequate time for meals  - Recommend/ encourage appropriate diets, oral nutritional supplements, and vitamin/mineral supplements  - Order, calculate, and assess calorie counts as needed  - Recommend, monitor, and adjust tube feedings and TPN/PPN based on assessed needs  - Assess need for intravenous fluids  - Provide specific nutrition/hydration education as appropriate  - Include patient/family/caregiver in decisions related to nutrition  Outcome: Progressing

## 2024-05-22 NOTE — PLAN OF CARE
Problem: PHYSICAL THERAPY ADULT  Goal: Performs mobility at highest level of function for planned discharge setting.  See evaluation for individualized goals.  Description: Treatment/Interventions: Functional transfer training, LE strengthening/ROM, Elevations, Therapeutic exercise, Endurance training, Cognitive reorientation, Patient/family training, Equipment eval/education, Bed mobility, Gait training, Compensatory technique education, Spoke to nursing, OT, Spoke to case management, Spoke to advanced practitioner          See flowsheet documentation for full assessment, interventions and recommendations.  5/22/2024 1528 by Ayden New PT  Outcome: Progressing  Note: Prognosis: Fair  Problem List: Decreased strength, Decreased endurance, Impaired balance, Decreased mobility, Decreased cognition, Impaired judgement, Decreased safety awareness  Pt tolerates tx session fairly.  PT convinces pt to utilize RW since her dynamic standing balance is impaired.  Pt improves to SBA using RW as compared to the min A needed with HHA.  Endurance deficits are evident with poor breathing pattern.  No c/o feeling dizzy or lightheaded throughout. Requires consistent cues for proper breathing technique.  Barriers to Discharge: Other (Comment)  Barriers to Discharge Comments: high fall risk; lacks insight; reports of multiple falls in the past; lives alone; impaired endurance/O2 needs --> O2 line mngmnt  Rehab Resource Intensity Level, PT: II (Moderate Resource Intensity)    See flowsheet documentation for full assessment.

## 2024-05-22 NOTE — PROGRESS NOTES
Progress Note - Pulmonary   Coco Ornelas 89 y.o. female MRN: [id number]  Unit/Bed#: -01 Encounter: [id number]      Assessment:  Acute hypoxic respiratory failure  Acute pulmonary embolism  Likely provoked by immobilization/inactivity due to deconditioning/fragility  No recent fall, trauma, surgery, procedure or long travel  Negative family history of VTE at young age  Frailty/deconditioning  RML collapse-likely retained secretions/hypoventilation from deconditioning  Hx diastolic CHF-noted moderate hypertrophy of LV on TTE  Pulmonary hypertension-RVSP 55 to 60 mmHg, suspect from both diastolic CHF and acute PE    Plan:  Evaluated by PT OT/considered a high risk for fall  On heparin infusion, will be switched to warfarin  Given the recent inactivity/deconditioned, recommend lifelong therapy if this risk factor will persists  Outpatient follow-up with pulmonary    Pulmonary will sign off, please do not hesitate to call with any questions      Subjective:   Sitting in chair, friends are at bedside.  No dyspnea at rest or chest pain.  No overnight events    Vitals: Blood pressure 145/78, pulse 102, temperature 97.8 °F (36.6 °C), temperature source Temporal, resp. rate 18, height 5' (1.524 m), weight 42 kg (92 lb 9.6 oz), SpO2 93%., Body mass index is 18.08 kg/m².      Intake/Output Summary (Last 24 hours) at 5/22/2024 1305  Last data filed at 5/22/2024 0741  Gross per 24 hour   Intake 30 ml   Output 550 ml   Net -520 ml       Physical Exam  Gen: not in acute distress, frail, deconditioned, nontoxic Body mass index is 18.08 kg/m².   HEENT:supple, no accessory muscle use, no JVD appreciated  Cardiac: RRR, no accentuated S2, no murmurs appreciated  Lungs: normal respiratory effort, diminished but clear sounds bilaterally  Abdomen: soft, nontender, normoactive bowel sounds  Extremities: no cyanosis, no clubbing, no edema  Neuro: AAO X3, no focal motor deficit    Labs: I have personally reviewed pertinent lab  results.        Renan Cutler MD

## 2024-05-22 NOTE — ASSESSMENT & PLAN NOTE
Currently taking using eye drops daily  Continue outpatient ophthalmology follow up  Son-in-law to bring in eye drops.

## 2024-05-22 NOTE — UTILIZATION REVIEW
NOTIFICATION OF INPATIENT ADMISSION   AUTHORIZATION REQUEST   SERVICING FACILITY:   [place] [address]  Tax ID: [id number]  NPI: [id number] ATTENDING PROVIDER:  Attending Name and NPI#: Jesús Win Do [[id number]]  Address: [address] [place] [address]  Phone: [phone]   ADMISSION INFORMATION:  Place of Service: Inpatient [facility]  Place of Service Code: 21  Inpatient Admission Date/Time: 5/20/24  4:57 PM  Discharge Date/Time: No discharge date for patient encounter.  Admitting Diagnosis Code/Description:  SOB (shortness of breath) [R06.02]  Hypoxia [R09.02]  Acute respiratory failure with hypoxia (HCC) [J96.01]  Multiple subsegmental pulmonary emboli without acute cor pulmonale (HCC) [I26.94]     UTILIZATION REVIEW CONTACT:  Rylie Manzanares, Utilization   Network Utilization Review Department  Phone: [phone]  Fax [phone]  Email: [email or web address]  Contact for approvals/pending authorizations, clinical reviews, and discharge.     PHYSICIAN ADVISORY SERVICES:  Medical Necessity Denial & Shmy-vm-Kgdw Review  Phone: [phone]  Fax: [phone]  Email: [email or web address]     DISCHARGE SUPPORT TEAM:  For Patients Discharge Needs & Updates  Phone: [phone] opt. 2 Fax: [phone]  Email: [email or web address]

## 2024-05-22 NOTE — PLAN OF CARE
Problem: SAFETY ADULT  Goal: Patient will remain free of falls  Description: INTERVENTIONS:  - Educate patient/family on patient safety including physical limitations  - Instruct patient to call for assistance with activity   - Consult OT/PT to assist with strengthening/mobility   - Keep Call bell within reach  - Keep bed low and locked with side rails adjusted as appropriate  - Keep care items and personal belongings within reach  - Initiate and maintain comfort rounds  - Make Fall Risk Sign visible to staff  - Offer Toileting every 2 Hours, in advance of need  - Initiate/Maintain   alarm  - Obtain necessary fall risk management equipment:     - Apply yellow socks and bracelet for high fall risk patients  - Consider moving patient to room near nurses station  Outcome: Progressing  Goal: Maintain or return to baseline ADL function  Description: INTERVENTIONS:  -  Assess patient's ability to carry out ADLs; assess patient's baseline for ADL function and identify physical deficits which impact ability to perform ADLs (bathing, care of mouth/teeth, toileting, grooming, dressing, etc.)  - Assess/evaluate cause of self-care deficits   - Assess range of motion  - Assess patient's mobility; develop plan if impaired  - Assess patient's need for assistive devices and provide as appropriate  - Encourage maximum independence but intervene and supervise when necessary  - Involve family in performance of ADLs  - Assess for home care needs following discharge   - Consider OT consult to assist with ADL evaluation and planning for discharge  - Provide patient education as appropriate  Outcome: Progressing  Goal: Maintains/Returns to pre admission functional level  Description: INTERVENTIONS:  - Perform AM-PAC 6 Click Basic Mobility/ Daily Activity assessment daily.  - Set and communicate daily mobility goal to care team and patient/family/caregiver.   - Collaborate with rehabilitation services on mobility goals if consulted  -  Perform Range of Motion 3 times a day.  - Reposition patient every 2 hours.  - Dangle patient 3 times a day  - Stand patient 3 times a day  - Ambulate patient 3 times a day  - Out of bed to chair 3 times a day   - Out of bed for meals 3 times a day  - Out of bed for toileting  - Record patient progress and toleration of activity level   Outcome: Progressing     Problem: DISCHARGE PLANNING  Goal: Discharge to home or other facility with appropriate resources  Description: INTERVENTIONS:  - Identify barriers to discharge w/patient and caregiver  - Arrange for needed discharge resources and transportation as appropriate  - Identify discharge learning needs (meds, wound care, etc.)  - Arrange for interpretive services to assist at discharge as needed  - Refer to Case Management Department for coordinating discharge planning if the patient needs post-hospital services based on physician/advanced practitioner order or complex needs related to functional status, cognitive ability, or social support system  Outcome: Progressing     Problem: Knowledge Deficit  Goal: Patient/family/caregiver demonstrates understanding of disease process, treatment plan, medications, and discharge instructions  Description: Complete learning assessment and assess knowledge base.  Interventions:  - Provide teaching at level of understanding  - Provide teaching via preferred learning methods  Outcome: Progressing     Problem: Nutrition/Hydration-ADULT  Goal: Nutrient/Hydration intake appropriate for improving, restoring or maintaining nutritional needs  Description: Monitor and assess patient's nutrition/hydration status for malnutrition. Collaborate with interdisciplinary team and initiate plan and interventions as ordered.  Monitor patient's weight and dietary intake as ordered or per policy. Utilize nutrition screening tool and intervene as necessary. Determine patient's food preferences and provide high-protein, high-caloric foods as  appropriate.     INTERVENTIONS:  - Monitor oral intake, urinary output, labs, and treatment plans  - Assess nutrition and hydration status and recommend course of action  - Evaluate amount of meals eaten  - Assist patient with eating if necessary   - Allow adequate time for meals  - Recommend/ encourage appropriate diets, oral nutritional supplements, and vitamin/mineral supplements  - Order, calculate, and assess calorie counts as needed  - Recommend, monitor, and adjust tube feedings and TPN/PPN based on assessed needs  - Assess need for intravenous fluids  - Provide specific nutrition/hydration education as appropriate  - Include patient/family/caregiver in decisions related to nutrition  Outcome: Progressing

## 2024-05-22 NOTE — PLAN OF CARE
Problem: OCCUPATIONAL THERAPY ADULT  Goal: Performs self-care activities at highest level of function for planned discharge setting.  See evaluation for individualized goals.  Description: Treatment Interventions: ADL retraining, Functional transfer training, Visual perceptual retraining, UE strengthening/ROM, Cognitive reorientation, Endurance training, Patient/family training, Equipment evaluation/education, Neuromuscular reeducation, Compensatory technique education, Fine motor coordination activities, UE splinting, Energy conservation, Continued evaluation, Cardiac education, Activityengagement          See flowsheet documentation for full assessment, interventions and recommendations.   Note: Limitation: Decreased ADL status, Decreased UE strength, Decreased Safe judgement during ADL, Decreased cognition, Decreased endurance, Decreased self-care trans, Decreased high-level ADLs  Prognosis: Good  Assessment: Pt is a 89 y.o. female, admitted to [facility] 5/20/2024 d/t experiencing SOB. Dx: acute PE. Pt with PMHx impacting their performance during ADL tasks, including: diarrhea, glaucoma, macular degeneration. Prior to admission to the hospital Pt was performing ADLs without physical assistance. IADLs without physical assistance. Functional transfers/ambulation without physical assistance. Cognitive status was PTA was Intact. OT order placed to assess Pt's ADLs, cognitive status, and performance during functional tasks in order to maximize safety and independence while making most appropriate d/c recommendations. PT/OT co-evaluation completed at this time d/t mobility deficits and safety concerns. Pt's clinical presentation is currently unstable/unpredictable given new onset deficits that effect Pt's occupational performance and ability to safely return to PLOF including decrease activity tolerance, decrease standing balance, decrease performance during ADL tasks, decrease cognition, decrease safety awareness ,  decrease UB MS, decrease generalized strength, decrease activity engagement, and decrease performance during functional transfers combined with medical complications of abnormal H&H, abnormal CBC, low SpO2 values, new onset O2 use, and need for input for mobility technique/safety. Pt requiring 1lpm of O2 for mobility at this time. Personal factors affecting Pt at time of initial evaluation include: step(s) to enter environment, limited home support, advanced age, new need for AD, limited insight into impairments, recent fall(s)/fall history, and questionable non-compliance. Pt will benefit from continued skilled OT services to address deficits as defined above and to maximize level independence/participation during ADLs and functional tasks to facilitate return toward PLOF and improved quality of life. From an occupational therapy standpoint, recommendation at time of d/c would be Level II: Moderate Resource Intensity Therapy.     Rehab Resource Intensity Level, OT: II (Moderate Resource Intensity)

## 2024-05-22 NOTE — ASSESSMENT & PLAN NOTE
Presented to ED with shortness of breath for the last 6 weeks but has been worsening recently. Patient now unable to lay flat when sleeping and needed to prop head up on pillows. Recently diagnosed with a heart murmur outpatient.   EKG sinus rhythm HR 98 bpm with PAC and PVCs  D-dimer elevated at 2.54  Chest CTA pe study: Acute segmental and subsegmental pulmonary embolus in both upper lobes with nothing to indicate right heart strain.   LE Duplex US without evidence of DVT  Echo: Left ventricle is normal in size and function. Estimated LVEF 70%. Mild to moderate concentric left ventricular hypertrophy. No regional wall motion abnormality. The inferior vena cava is dilated   Troponin flat    Heparin drip started  Discussion had with son in law at bedside regarding risks vs benefits of AC, understands that bleeding is risk of AC Understands that if patient has cut or falls, should be re-evaluated in the ED.   Started patient on coumadin due to high fall risk  Of note, patient is jehovah witness, no blood products if she does have decrease in hgb  Consult pulmonology

## 2024-05-22 NOTE — PHYSICAL THERAPY NOTE
" PHYSICAL THERAPY EVALUATION      NAME:  Coco Ornelas  DATE: 05/22/24    AGE:   89 y.o.  Mrn:   [id number]  ADMIT DX:  SOB (shortness of breath) [R06.02]  Hypoxia [R09.02]  Acute respiratory failure with hypoxia (HCC) [J96.01]  Multiple subsegmental pulmonary emboli without acute cor pulmonale (HCC) [I26.94]    Past Medical History:   Diagnosis Date    Diarrhea     Glaucoma     Macular degeneration      Length Of Stay: 2  Performed at least 2 patient identifiers during session: Name and Birthday          PHYSICAL THERAPY EVALUATION:       05/22/24 1112   PT Last Visit   PT Visit Date 05/22/24   Note Type   Note type Evaluation   Pain Assessment   Pain Assessment Tool 0-10   Pain Score No Pain   Restrictions/Precautions   Weight Bearing Precautions Per Order No   Other Precautions Cognitive;Chair Alarm;Bed Alarm;O2;Fall Risk  (1L O2)   Home Living   Type of Home House   Home Layout Two level;Performs ADLs on one level;Able to live on main level with bedroom/bathroom  (1 ERIK)   Bathroom Shower/Tub Tub/shower unit   Bathroom Toilet Standard   Bathroom Equipment Grab bars in shower   Prior Function   Level of [place] Independent with ADLs;Independent with functional mobility;Independent with IADLS   Lives With Alone   Receives Help From Family;Friend(s);Neighbor   IADLs Independent with meal prep;Independent with medication management;Family/Friend/Other provides transportation   Falls in the last 6 months   (\"none that I consider serious, but I've been on the floor multiple times\")   Comments Mod (I) \"furniture walking\" around the room   General   Family/Caregiver Present Yes  (friend(s))   Cognition   Overall Cognitive Status Impaired   Attention Attends with cues to redirect   Orientation Level Oriented to person;Oriented to place;Oriented to situation;Disoriented to time   Memory Decreased recall of recent events   Following Commands Follows one step commands with increased time or repetition   Comments lacks " "insight into deficits/situation   Subjective   Subjective \"I just reach for things around the house to hold onto when I walk\"   RLE Assessment   RLE Assessment WFL   LLE Assessment   LLE Assessment WFL   Light Touch   RLE Light Touch Grossly intact   LLE Light Touch Grossly intact   Bed Mobility   Supine to Sit 5  Supervision   Additional items HOB elevated;Verbal cues   Transfers   Sit to Stand   (SBA)   Additional items Verbal cues   Stand to Sit   (SBA)   Additional items Verbal cues   Stand pivot 4  Minimal assistance   Additional items Assist x 1;Increased time required;Verbal cues   Additional Comments hand held assist   Ambulation/Elevation   Gait pattern Improper Weight shift;Inconsistent bhavana   Gait Assistance 4  Minimal assist   Additional items Assist x 1;Verbal cues   Assistive Device   (hand held assist)   Distance 17 ft   Balance   Static Sitting Good   Dynamic Sitting Fair +   Static Standing Poor +   Dynamic Standing Poor   Ambulatory Poor   Endurance Deficit   Endurance Deficit Yes   Endurance Deficit Description SpO2 dec to 85% with exertion on RA   Activity Tolerance   Activity Tolerance Patient limited by fatigue   Medical Staff Made Aware OT [place]   Assessment   Prognosis Fair   Problem List Decreased strength;Decreased endurance;Impaired balance;Decreased mobility;Decreased cognition;Impaired judgement;Decreased safety awareness   Barriers to Discharge Other (Comment)   Barriers to Discharge Comments high fall risk; lacks insight; reports of multiple falls in the past; lives alone; impaired endurance/O2 needs --> O2 line mngmnt   Goals   STG Expiration Date 06/05/24   PT Treatment Day 1   Plan   Treatment/Interventions Functional transfer training;LE strengthening/ROM;Elevations;Therapeutic exercise;Endurance training;Cognitive reorientation;Patient/family training;Equipment eval/education;Bed mobility;Gait training;Compensatory technique education;Spoke to nursing;OT;Spoke to case " management;Spoke to advanced practitioner   PT Frequency 3-5x/wk   Discharge Recommendation   Rehab Resource Intensity Level, PT II (Moderate Resource Intensity)   Additional Comments if pt were to decline PAR, pt will require increased support/assistance at home   AM-PAC Basic Mobility Inpatient   Turning in Flat Bed Without Bedrails 3   Lying on Back to Sitting on Edge of Flat Bed Without Bedrails 3   Moving Bed to Chair 3   Standing Up From Chair Using Arms 3   Walk in Room 3   Climb 3-5 Stairs With Railing 3   Basic Mobility Inpatient Raw Score 18   Basic Mobility Standardized Score 41.05   [facility] Highest Level Of Mobility   -HLM Goal 6: Walk 10 steps or more   -HLM Achieved 7: Walk 25 feet or more   Additional Treatment Session   Start Time 1131   End Time 1142   Treatment Assessment Pt tolerates tx session fairly.  PT convinces pt to utilize RW since her dynamic standing balance is impaired.  Pt improves to SBA using RW as compared to the min A needed with HHA.  Endurance deficits are evident with poor breathing pattern.  No c/o feeling dizzy or lightheaded throughout. Requires consistent cues for proper breathing technique.   Equipment Use Pt performs sit to stand SBA.  Pt ambulates 20 ft x 1 and 75 ft x 1 using RW and SBA.  Pt requiring 1L O2 as she continues to de-sat into the mid-upper 80s on RA with exertion.   End of Consult   Patient Position at End of Consult Bedside chair;Bed/Chair alarm activated;All needs within reach   End of Consult Comments friends present; 1L O2     (Please find full objective findings from PT assessment regarding body systems outlined above).     Assessment: Pt is a 89 y.o. female seen for PT evaluation s/p admission to [facility] on 5/20/2024 with Acute pulmonary embolism (HCC).  Order placed for PT services.  Upon evaluation: Pt is presenting with impaired functional mobility due to decreased strength, decreased endurance, impaired balance, gait  deviations, impaired judgment, and fall risk requiring  SPV assistance for bed mobility and stand-by to min assistance for transfers and ambulation with hand held assist . Pt's clinical presentation is currently unstable/unpredictable given the functional mobility deficits above, especially decreased endurance, decreased activity tolerance, and decreased functional mobility tolerance, coupled with fall risks as indicated by -PAC 6-Clicks: 18/24 as well as hx of falls and combined with medical complications of abnormal CBC and need for input for mobility technique/safety.  Pt's PMHx and comorbidities that may affect physical performance and progress include:  glaucoma & macular degeneration . Personal factors affecting pt at time of IE include: anxiety, availability as recommended, advanced age, inability to perform IADLs, limited insight into impairments, and recent fall(s)/fall history. Pt will benefit from continued skilled PT services to address deficits as defined above and to maximize level of functional mobility to facilitate return toward PLOF and improved QOL. From PT/mobility standpoint, recommendation at time of d/c would be Level II (Moderate Resource Intensity pending progress in order to reduce fall risk and maximize pt's functional independence and consistency with mobility in order to facilitate return to PLOF.  Recommend trial with walker next 1-2 sessions and ther ex next 1-2 sessions.     The patient's -[facility] Basic Mobility Inpatient Short Form Raw Score is 18. A Raw score of greater than 16 suggests the patient may benefit from discharge to home. Please also refer to the recommendation of the Physical Therapist for safe discharge planning.       Goals: Pt will: Perform bed mobility tasks with modified I to reposition in bed and prepare for transfers. Pt will perform transfers with Supervision to increase Indep in home environment and decrease burden of care and prepare for ambulation. Pt will  ambulate with RW for >/= 150 ft with  Supervision  to decrease risk for falls, improve activity tolerance, improve gait quality, and promote proper use of assistive device and to access home environment. Pt will complete >/= 1 step with with unilateral handrail with Supervision to return to home with ERIK. Pt will participate in objective balance assessment to determine baseline fall risk.        Ayden New, PT,DPT

## 2024-05-23 VITALS
RESPIRATION RATE: 18 BRPM | HEIGHT: 60 IN | WEIGHT: 92 LBS | BODY MASS INDEX: 18.06 KG/M2 | SYSTOLIC BLOOD PRESSURE: 149 MMHG | OXYGEN SATURATION: 97 % | HEART RATE: 92 BPM | TEMPERATURE: 98.1 F | DIASTOLIC BLOOD PRESSURE: 78 MMHG

## 2024-05-23 PROBLEM — J96.00 ACUTE RESPIRATORY FAILURE (HCC): Status: RESOLVED | Noted: 2024-05-20 | Resolved: 2024-05-23

## 2024-05-23 LAB
APTT PPP: 100 SECONDS (ref 23–37)
APTT PPP: 49 SECONDS (ref 23–37)
ERYTHROCYTE [DISTWIDTH] IN BLOOD BY AUTOMATED COUNT: 14.2 % (ref 11.6–15.1)
HCT VFR BLD AUTO: 37.5 % (ref 34.8–46.1)
HGB BLD-MCNC: 11.8 G/DL (ref 11.5–15.4)
INR PPP: 1.06 (ref 0.84–1.19)
MCH RBC QN AUTO: 29.3 PG (ref 26.8–34.3)
MCHC RBC AUTO-ENTMCNC: 31.5 G/DL (ref 31.4–37.4)
MCV RBC AUTO: 93 FL (ref 82–98)
PLATELET # BLD AUTO: 320 THOUSANDS/UL (ref 149–390)
PMV BLD AUTO: 9 FL (ref 8.9–12.7)
PROTHROMBIN TIME: 14.1 SECONDS (ref 11.6–14.5)
RBC # BLD AUTO: 4.03 MILLION/UL (ref 3.81–5.12)
WBC # BLD AUTO: 6.17 THOUSAND/UL (ref 4.31–10.16)

## 2024-05-23 PROCEDURE — 85730 THROMBOPLASTIN TIME PARTIAL: CPT | Performed by: HOSPITALIST

## 2024-05-23 PROCEDURE — 99239 HOSP IP/OBS DSCHRG MGMT >30: CPT

## 2024-05-23 PROCEDURE — NC001 PR NO CHARGE

## 2024-05-23 PROCEDURE — 85610 PROTHROMBIN TIME: CPT | Performed by: HOSPITALIST

## 2024-05-23 PROCEDURE — 85027 COMPLETE CBC AUTOMATED: CPT | Performed by: HOSPITALIST

## 2024-05-23 RX ORDER — ENOXAPARIN SODIUM 100 MG/ML
40 INJECTION SUBCUTANEOUS DAILY
Start: 2024-05-23

## 2024-05-23 RX ORDER — WARFARIN SODIUM 5 MG/1
5 TABLET ORAL
Start: 2024-05-23

## 2024-05-23 RX ORDER — ENOXAPARIN SODIUM 100 MG/ML
40 INJECTION SUBCUTANEOUS 2 TIMES DAILY
Start: 2024-05-23 | End: 2024-05-23

## 2024-05-23 RX ORDER — DOCUSATE SODIUM 100 MG/1
100 CAPSULE, LIQUID FILLED ORAL 2 TIMES DAILY
Start: 2024-05-23

## 2024-05-23 RX ORDER — ENOXAPARIN SODIUM 100 MG/ML
1 INJECTION SUBCUTANEOUS DAILY
Status: DISCONTINUED | OUTPATIENT
Start: 2024-05-23 | End: 2024-05-23 | Stop reason: HOSPADM

## 2024-05-23 RX ADMIN — BROMFENAC OPHTHALMIC SOLUTION 0.09% 1 DROP: 1.03 SOLUTION/ DROPS OPHTHALMIC at 09:26

## 2024-05-23 RX ADMIN — HEPARIN SODIUM 1600 UNITS: 1000 INJECTION INTRAVENOUS; SUBCUTANEOUS at 10:36

## 2024-05-23 RX ADMIN — ENOXAPARIN SODIUM 40 MG: 40 INJECTION SUBCUTANEOUS at 14:55

## 2024-05-23 NOTE — ASSESSMENT & PLAN NOTE
Currently saturating around 96% on 2L NC, found to be tachypneic and requiring o2. Does not wear o2 at baseline.  Suspect secondary to pulmonary embolism  Echo with IVC dilation. EF 70%.   Pulm consulted  Does have some lower extremity swelling and concern for pulmonary edema, will order dose of lasix 20 mg po x 1.   Improved lung sounds today, will order another dose of lasix 20 mg x 1 due to IVC dilation on echo.   Wean off oxygen to keep o2 saturations >89%.   Saturating well on room air

## 2024-05-23 NOTE — CASE MANAGEMENT
[facility] received request for authorization from Care Manager.  Authorization request submitted for: [facility]  Facility Name: [facility]  NPI: [id number]  Facility MD:  Dr. Brandi Neri   NPI:  [id number]  Authorization initiated by contacting insurance:  [employer]  Via: [employer]   Clinicals submitted via Portal attachment   Pending Reference #: [id number]    Care Manager notified: Krysta Linn    Updates to authorization status will be noted in chart. Please reach out to CM for updates on any clinical information.

## 2024-05-23 NOTE — CASE MANAGEMENT
[facility] has received APPROVED authorization.  Insurance:   [employer]  Called in by Rep: Elizabeth SRINI#  [phone]  Authorization received for: [facility]  Facility: [facility]   Authorization #: [id number]   Start of Care: 5/23  Next Review Date: 2 business days  Continued Stay Care Coordinator: none given  Submit next review to: [phone]     Care Manager notified: Krysta Linn    Please reach out to  for updates on any clinical information.

## 2024-05-23 NOTE — PLAN OF CARE
Problem: SAFETY ADULT  Goal: Patient will remain free of falls  Description: INTERVENTIONS:  - Educate patient/family on patient safety including physical limitations  - Instruct patient to call for assistance with activity   - Consult OT/PT to assist with strengthening/mobility   - Keep Call bell within reach  - Keep bed low and locked with side rails adjusted as appropriate  - Keep care items and personal belongings within reach  - Initiate and maintain comfort rounds  - Make Fall Risk Sign visible to staff  - Apply yellow socks and bracelet for high fall risk patients  - Consider moving patient to room near nurses station  Outcome: Progressing  Goal: Maintain or return to baseline ADL function  Description: INTERVENTIONS:  -  Assess patient's ability to carry out ADLs; assess patient's baseline for ADL function and identify physical deficits which impact ability to perform ADLs (bathing, care of mouth/teeth, toileting, grooming, dressing, etc.)  - Assess/evaluate cause of self-care deficits   - Assess range of motion  - Assess patient's mobility; develop plan if impaired  - Assess patient's need for assistive devices and provide as appropriate  - Encourage maximum independence but intervene and supervise when necessary  - Involve family in performance of ADLs  - Assess for home care needs following discharge   - Consider OT consult to assist with ADL evaluation and planning for discharge  - Provide patient education as appropriate  Outcome: Progressing  Goal: Maintains/Returns to pre admission functional level  Description: INTERVENTIONS:  - Perform AM-PAC 6 Click Basic Mobility/ Daily Activity assessment daily.  - Set and communicate daily mobility goal to care team and patient/family/caregiver.   - Collaborate with rehabilitation services on mobility goals if consulted  - Out of bed for toileting  - Record patient progress and toleration of activity level   Outcome: Progressing     Problem: DISCHARGE  PLANNING  Goal: Discharge to home or other facility with appropriate resources  Description: INTERVENTIONS:  - Identify barriers to discharge w/patient and caregiver  - Arrange for needed discharge resources and transportation as appropriate  - Identify discharge learning needs (meds, wound care, etc.)  - Arrange for interpretive services to assist at discharge as needed  - Refer to Case Management Department for coordinating discharge planning if the patient needs post-hospital services based on physician/advanced practitioner order or complex needs related to functional status, cognitive ability, or social support system  Outcome: Progressing     Problem: Knowledge Deficit  Goal: Patient/family/caregiver demonstrates understanding of disease process, treatment plan, medications, and discharge instructions  Description: Complete learning assessment and assess knowledge base.  Interventions:  - Provide teaching at level of understanding  - Provide teaching via preferred learning methods  Outcome: Progressing     Problem: Nutrition/Hydration-ADULT  Goal: Nutrient/Hydration intake appropriate for improving, restoring or maintaining nutritional needs  Description: Monitor and assess patient's nutrition/hydration status for malnutrition. Collaborate with interdisciplinary team and initiate plan and interventions as ordered.  Monitor patient's weight and dietary intake as ordered or per policy. Utilize nutrition screening tool and intervene as necessary. Determine patient's food preferences and provide high-protein, high-caloric foods as appropriate.     INTERVENTIONS:  - Monitor oral intake, urinary output, labs, and treatment plans  - Assess nutrition and hydration status and recommend course of action  - Evaluate amount of meals eaten  - Assist patient with eating if necessary   - Allow adequate time for meals  - Recommend/ encourage appropriate diets, oral nutritional supplements, and vitamin/mineral supplements  -  Order, calculate, and assess calorie counts as needed  - Recommend, monitor, and adjust tube feedings and TPN/PPN based on assessed needs  - Assess need for intravenous fluids  - Provide specific nutrition/hydration education as appropriate  - Include patient/family/caregiver in decisions related to nutrition  Outcome: Progressing

## 2024-05-23 NOTE — PROGRESS NOTES
Patient:    MRN:  [id number]    Alexsandra Request ID:  [id number]    Level of care reserved:  Skilled Nursing Facility    Partner Reserved:  [place] [id number] [phone]    Clinical needs requested:    Geography searched:  20 miles around [id number]    Start of Service:    Request sent:  2:39pm EDT on 5/22/2024 by Krysta Linn    Partner reserved:  11:57am EDT on 5/23/2024 by Krysta Linn    Choice list shared:  10:04am EDT on 5/23/2024 by Krsyta Linn

## 2024-05-23 NOTE — ASSESSMENT & PLAN NOTE
Presented to ED with shortness of breath for the last 6 weeks but has been worsening recently. Patient now unable to lay flat when sleeping and needed to prop head up on pillows. Recently diagnosed with a heart murmur outpatient.   EKG sinus rhythm HR 98 bpm with PAC and PVCs  D-dimer elevated at 2.54  Chest CTA pe study: Acute segmental and subsegmental pulmonary embolus in both upper lobes with nothing to indicate right heart strain.   LE Duplex US without evidence of DVT  Echo: Left ventricle is normal in size and function. Estimated LVEF 70%. Mild to moderate concentric left ventricular hypertrophy. No regional wall motion abnormality. The inferior vena cava is dilated   Troponin flat    Heparin drip started  Discussion had with son in law at bedside regarding risks vs benefits of AC, understands that bleeding is risk of AC Understands that if patient has cut or falls, should be re-evaluated in the ED.   Started patient on coumadin due to high fall risk - should be on this lifelong due to sedentary lifestyle  Coumadin with lovenox bridge on discharge monitor inr until 2-3  Of note, patient is jehovah witness, no blood products if she does have decrease in hgb  Consult pulmonology

## 2024-05-23 NOTE — ASSESSMENT & PLAN NOTE
Malnutrition Findings:   Adult Malnutrition type: Chronic illness  Adult Degree of Malnutrition: Other severe protein calorie malnutrition  Malnutrition Characteristics: Muscle loss, Fat loss                  360 Statement: severe protein calorie malnutrition in setting of chronic illness, inadequate intake, evidenced by fat loss/hollowing orbitals, muscle wasting/protruding clavicles, hollowing temples, treated with diet as tolerated. PT is refusing supplements    BMI Findings:           Body mass index is 17.97 kg/m².

## 2024-05-23 NOTE — CASE MANAGEMENT
Case Management Discharge Planning Note    Patient name Coco Ornelas  Location /-01 MRN [id number]  : 1928 Date 2024       Current Admission Date: 2024  Current Admission Diagnosis:Acute pulmonary embolism (HCC)   Patient Active Problem List    Diagnosis Date Noted Date Diagnosed    Severe protein-calorie malnutrition (HCC) 2024     Acute pulmonary embolism (HCC) 2024     Dysphagia 2024     Glaucoma        LOS (days): 3  Geometric Mean LOS (GMLOS) (days): 4  Days to GMLOS:1     OBJECTIVE:  Risk of Unplanned Readmission Score: 8.11         Current admission status: Inpatient   Preferred Pharmacy:   [id number] - [place], [place] - [address] [place] [address]  Phone: [phone] Fax: [phone]    Primary Care Provider: No primary care provider on file.    Primary Insurance: [facility] REP  Secondary Insurance:     DISCHARGE DETAILS:    Discharge planning discussed with:: Patient, MANE  Freedom of Choice: Yes  Comments - Freedom of Choice: [facility] STR  CM contacted family/caregiver?: Yes  Were Treatment Team discharge recommendations reviewed with patient/caregiver?: Yes  Did patient/caregiver verbalize understanding of patient care needs?: Yes  Were patient/caregiver advised of the risks associated with not following Treatment Team discharge recommendations?: Yes    Contacts  Patient Contacts: Shakeel GILLIAM  Relationship to Patient:: Family  Contact Method: In Person  Reason/Outcome: Discharge Planning    Requested Home Health Care         Is the patient interested in HHC at discharge?: No    DME Referral Provided  Referral made for DME?: No    Other Referral/Resources/Interventions Provided:  Interventions: Short Term Rehab  Referral Comments: [facility]    Would you like to participate in our [facility] service program?  : No - Declined    Treatment Team Recommendation: Short Term Rehab  Discharge Destination  Plan:: Short Term Rehab  Transport at Discharge : Family           ETA of Transport (Date): 05/23/24  ETA of Transport (Time): 1600           Accepting Facility Name, City & State : [facility]  Receiving Facility/Agency Phone Number: [phone] and ask for Jordyn Enciso  Facility/Agency Fax Number: [phone]       CM met with patient and MANE Shakeel - patient is agreeable to STR and choice is [facility]. A post acute care recommendation was made by your care team for STR.  Discussed Freedom of Choice with both patient and caregiver. List of facilities given to both patient and caregiver via in person. both patient and caregiver aware the list is custom filtered for them by zip code location and that [facility] post acute providers are designated.       CM reserved [facility] and informed them of being patient's choice.     CM completed insurance auth request. Auth was received.     CM discussed transportation with family - patient's MANE will transport when discharged.     CM informed [facility] of patient's auth received and discharge timeframe.     CM to follow for patient's care and discharge needs.

## 2024-05-23 NOTE — PROGRESS NOTES
[facility]  Progress Note  Name: Coco Ornelas I  MRN: [id number]  Unit/Bed#: -01 I Date of Admission: 5/20/2024   Date of Service: 5/23/2024 I Hospital Day: 3    Assessment & Plan   * Acute pulmonary embolism (HCC)  Assessment & Plan  Presented to ED with shortness of breath for the last 6 weeks but has been worsening recently. Patient now unable to lay flat when sleeping and needed to prop head up on pillows. Recently diagnosed with a heart murmur outpatient.   EKG sinus rhythm HR 98 bpm with PAC and PVCs  D-dimer elevated at 2.54  Chest CTA pe study: Acute segmental and subsegmental pulmonary embolus in both upper lobes with nothing to indicate right heart strain.   LE Duplex US without evidence of DVT  Echo: Left ventricle is normal in size and function. Estimated LVEF 70%. Mild to moderate concentric left ventricular hypertrophy. No regional wall motion abnormality. The inferior vena cava is dilated   Troponin flat    Heparin drip started  Discussion had with son in law at bedside regarding risks vs benefits of AC, understands that bleeding is risk of AC Understands that if patient has cut or falls, should be re-evaluated in the ED.   Started patient on coumadin due to high fall risk - should be on this lifelong due to sedentary lifestyle  Of note, patient is jehovah witness, no blood products if she does have decrease in hgb  Consult pulmonology    Severe protein-calorie malnutrition (HCC)  Assessment & Plan  Malnutrition Findings:   Adult Malnutrition type: Chronic illness  Adult Degree of Malnutrition: Other severe protein calorie malnutrition  Malnutrition Characteristics: Muscle loss, Fat loss                  360 Statement: severe protein calorie malnutrition in setting of chronic illness, inadequate intake, evidenced by fat loss/hollowing orbitals, muscle wasting/protruding clavicles, hollowing temples, treated with diet as tolerated. PT is refusing  supplements    BMI Findings:           Body mass index is 17.97 kg/m².       Dysphagia  Assessment & Plan  Endorses some difficulty with swallowing, states that she has hard time intermittently with pills. Has not had any EGD done recently. Discussed and would not want further workup for this  Will have speech evaluate patient, but hold on GI consult at this time.   Speech: soft/level 3 diet and thin liquids; meds whole with puree.     Glaucoma  Assessment & Plan  Currently taking using eye drops daily  Continue outpatient ophthalmology follow up  Son-in-law to bring in eye drops.     Acute respiratory failure (HCC)-resolved as of 5/23/2024  Assessment & Plan  Currently saturating around 96% on 2L NC, found to be tachypneic and requiring o2. Does not wear o2 at baseline.  Suspect secondary to pulmonary embolism  Echo with IVC dilation. EF 70%.   Pulm consulted  Does have some lower extremity swelling and concern for pulmonary edema, will order dose of lasix 20 mg po x 1.   Improved lung sounds today, will order another dose of lasix 20 mg x 1 due to IVC dilation on echo.   Wean off oxygen to keep o2 saturations >89%.   Saturating well on room air              VTE Pharmacologic Prophylaxis: VTE Score: 6 High Risk (Score >/= 5) - Pharmacological DVT Prophylaxis Ordered: warfarin (Coumadin). Sequential Compression Devices Ordered.    Mobility:   Basic Mobility Inpatient Raw Score: 18  JH-HLM Goal: 6: Walk 10 steps or more  JH-HLM Achieved: 7: Walk 25 feet or more  JH-HLM Goal achieved. Continue to encourage appropriate mobility.    Patient Centered Rounds: I performed bedside rounds with nursing staff today.   Discussions with Specialists or Other Care Team Provider: nursing and cm    Education and Discussions with Family / Patient: Updated  (son in law) via phone.    Total Time Spent on Date of Encounter in care of patient:  mins. This time was spent on one or more of the following: performing physical  exam; counseling and coordination of care; obtaining or reviewing history; documenting in the medical record; reviewing/ordering tests, medications or procedures; communicating with other healthcare professionals and discussing with patient's family/caregivers.    Current Length of Stay: 3 day(s)  Current Patient Status: Inpatient   Certification Statement: The patient will continue to require additional inpatient hospital stay due to requiring heparin drip bridge to coumadin  Discharge Plan: Anticipate discharge in 24-48 hrs to rehab facility.    Code Status: Level 3 - DNAR and DNI    Subjective:   Patient states that she is feeling well today and not in any pain. Denies chest pain, shortness of breath or abdominal pain. Does not offer any further complaints at this time.    Objective:     Vitals:   Temp (24hrs), Av.9 °F (36.6 °C), Min:97.7 °F (36.5 °C), Max:98.1 °F (36.7 °C)    Temp:  [97.7 °F (36.5 °C)-98.1 °F (36.7 °C)] 98.1 °F (36.7 °C)  HR:  [89-99] 92  Resp:  [17-18] 18  BP: (142-149)/(75-78) 149/78  SpO2:  [95 %-97 %] 97 %  Body mass index is 17.97 kg/m².     Input and Output Summary (last 24 hours):     Intake/Output Summary (Last 24 hours) at 2024 1127  Last data filed at 2024 1700  Gross per 24 hour   Intake 240 ml   Output 200 ml   Net 40 ml       Physical Exam:   Physical Exam  Vitals reviewed.   Constitutional:       General: She is not in acute distress.     Appearance: Normal appearance. She is not ill-appearing.      Comments: frail   HENT:      Head: Normocephalic and atraumatic.      Nose: Nose normal.      Mouth/Throat:      Mouth: Mucous membranes are moist.      Pharynx: Oropharynx is clear.   Eyes:      Extraocular Movements: Extraocular movements intact.      Conjunctiva/sclera: Conjunctivae normal.   Cardiovascular:      Rate and Rhythm: Normal rate and regular rhythm.      Pulses: Normal pulses.      Heart sounds: Murmur heard.   Pulmonary:      Effort: Pulmonary effort is  normal. No respiratory distress.      Breath sounds: Normal breath sounds. No wheezing or rhonchi.   Abdominal:      General: Abdomen is flat. Bowel sounds are normal. There is no distension.      Palpations: Abdomen is soft.      Tenderness: There is no abdominal tenderness. There is no guarding.   Musculoskeletal:         General: Normal range of motion.      Cervical back: Normal range of motion.      Right lower leg: No edema.      Left lower leg: No edema.   Skin:     General: Skin is warm.   Neurological:      General: No focal deficit present.      Mental Status: She is alert and oriented to person, place, and time. Mental status is at baseline.      Motor: No weakness.   Psychiatric:         Mood and Affect: Mood normal.         Behavior: Behavior normal.         Thought Content: Thought content normal.         Judgment: Judgment normal.          Additional Data:     Labs:  Results from last 7 days   Lab Units 05/23/24  0839 05/21/24  1207 05/20/24  1439   WBC Thousand/uL 6.17   < > 7.51   HEMOGLOBIN g/dL 11.8   < > 12.8   HEMATOCRIT % 37.5   < > 40.4   PLATELETS Thousands/uL 320   < > 305   SEGS PCT %  --   --  61   LYMPHO PCT %  --   --  19   MONO PCT %  --   --  10   EOS PCT %  --   --  8*    < > = values in this interval not displayed.     Results from last 7 days   Lab Units 05/22/24  0538 05/21/24  1207 05/20/24  1539   SODIUM mmol/L 139   < > 136   POTASSIUM mmol/L 3.7   < > 5.0   CHLORIDE mmol/L 104   < > 106   CO2 mmol/L 29   < > 25   BUN mg/dL 17   < > 15   CREATININE mg/dL 0.80   < > 0.70   ANION GAP mmol/L 6   < > 5   CALCIUM mg/dL 8.4   < > 8.5   ALBUMIN g/dL  --   --  3.1*   TOTAL BILIRUBIN mg/dL  --   --  0.75   ALK PHOS U/L  --   --  57   ALT U/L  --   --  10   AST U/L  --   --  30   GLUCOSE RANDOM mg/dL 106   < > 91    < > = values in this interval not displayed.     Results from last 7 days   Lab Units 05/23/24  0839   INR  1.06             Results from last 7 days   Lab Units  05/20/24  1439   LACTIC ACID mmol/L 1.2       Lines/Drains:  Invasive Devices       Peripheral Intravenous Line  Duration             Peripheral IV 05/20/24 Right Wrist 2 days    Peripheral IV 05/21/24 Distal;Dorsal (posterior);Left Forearm 1 day                          Imaging: Reviewed radiology reports from this admission including: ECHO    Recent Cultures (last 7 days):   Results from last 7 days   Lab Units 05/20/24  1536 05/20/24  1439   BLOOD CULTURE  No Growth at 48 hrs. No Growth at 48 hrs.       Last 24 Hours Medication List:   Current Facility-Administered Medications   Medication Dose Route Frequency Provider Last Rate    acetaminophen  650 mg Oral Q4H PRN Cayla Ryan Gnall, PA-C      Bromfenac Sodium (Once-Daily)  1 drop Left Eye BID Cayla Ryan Gnall, PA-C      docusate sodium  100 mg Oral BID Cayla Ryan Gnall, PA-C      heparin (porcine)  3-30 Units/kg/hr (Order-Specific) Intravenous Titrated Cayla Ryan Gnall, PA-C 15 Units/kg/hr (05/23/24 1038)    heparin (porcine)  1,600 Units Intravenous Q6H PRN Cayla Ryan Gnall, PA-C      heparin (porcine)  3,200 Units Intravenous Q6H PRN Calya Ryan Gnall, PA-C      simethicone  80 mg Oral 4x Daily PRN Cayla Ryan Gnall, PA-C      trimethobenzamide  200 mg Intramuscular Q6H PRN Cayla Ryan Gnall, PA-C      warfarin  5 mg Oral Daily (warfarin) Yumiko Walker PA-C          Today, Patient Was Seen By: Yumiko Walker PA-C    **Please Note: This note may have been constructed using a voice recognition system.**

## 2024-05-23 NOTE — DISCHARGE SUMMARY
[facility]  Discharge- Coco Ornelas 2/20/1928, 89 y.o. female MRN: [id number]  Unit/Bed#: -Inder Encounter: [id number]  Primary Care Provider: No primary care provider on file.   Date and time admitted to hospital: 5/20/2024  2:24 PM    * Acute pulmonary embolism (HCC)  Assessment & Plan  Presented to ED with shortness of breath for the last 6 weeks but has been worsening recently. Patient now unable to lay flat when sleeping and needed to prop head up on pillows. Recently diagnosed with a heart murmur outpatient.   EKG sinus rhythm HR 98 bpm with PAC and PVCs  D-dimer elevated at 2.54  Chest CTA pe study: Acute segmental and subsegmental pulmonary embolus in both upper lobes with nothing to indicate right heart strain.   LE Duplex US without evidence of DVT  Echo: Left ventricle is normal in size and function. Estimated LVEF 70%. Mild to moderate concentric left ventricular hypertrophy. No regional wall motion abnormality. The inferior vena cava is dilated   Troponin flat    Heparin drip started  Discussion had with son in law at bedside regarding risks vs benefits of AC, understands that bleeding is risk of AC Understands that if patient has cut or falls, should be re-evaluated in the ED.   Started patient on coumadin due to high fall risk - should be on this lifelong due to sedentary lifestyle  Coumadin with lovenox bridge on discharge monitor inr until 2-3. Lovenox weight based daily due to crcl <30  Of note, patient is jehovah witness, no blood products if she does have decrease in hgb  Consult pulmonology    Severe protein-calorie malnutrition (HCC)  Assessment & Plan  Malnutrition Findings:   Adult Malnutrition type: Chronic illness  Adult Degree of Malnutrition: Other severe protein calorie malnutrition  Malnutrition Characteristics: Muscle loss, Fat loss                  360 Statement: severe protein calorie malnutrition in setting of chronic illness, inadequate intake,  evidenced by fat loss/hollowing orbitals, muscle wasting/protruding clavicles, hollowing temples, treated with diet as tolerated. PT is refusing supplements    BMI Findings:           Body mass index is 17.97 kg/m².       Dysphagia  Assessment & Plan  Endorses some difficulty with swallowing, states that she has hard time intermittently with pills. Has not had any EGD done recently. Discussed and would not want further workup for this  Will have speech evaluate patient, but hold on GI consult at this time.   Speech: soft/level 3 diet and thin liquids; meds whole with puree.     Glaucoma  Assessment & Plan  Currently taking using eye drops daily  Continue outpatient ophthalmology follow up  Son-in-law to bring in eye drops.       Medical Problems       Resolved Problems  Date Reviewed: 5/23/2024            Resolved    Acute respiratory failure (HCC) 5/23/2024     Resolved by  Yumiko Walker PA-C        Discharging Physician / Practitioner: Yumiko Walker PA-C  PCP: No primary care provider on file.  Admission Date:   Admission Orders (From admission, onward)       Ordered        05/20/24 1657  INPATIENT ADMISSION  Once                          Discharge Date: 05/23/24    Consultations During Hospital Stay:  Pulmonology    Procedures Performed:   None     Significant Findings / Test Results:   CTA ED chest PE study: Acute segmental and subsegmental pulmonary embolus in both upper lobes with nothing to indicate right heart strain., Groundglass opacity and septal thickening due to pulmonary edema., Occlusion of the right middle lobe bronchus with complete right middle lobe collapse, question due to benign mucous plugging versus malignancy., Pulmonary artery enlargement which can be a normal variant or seen with pulmonary hypertension.   Venous duplex negative for dvt    Incidental Findings:   CTA ED chest PE study: Acute segmental and subsegmental pulmonary embolus in both upper lobes with nothing to indicate right heart  strain., Groundglass opacity and septal thickening due to pulmonary edema., Occlusion of the right middle lobe bronchus with complete right middle lobe collapse, question due to benign mucous plugging versus malignancy., Pulmonary artery enlargement which can be a normal variant or seen with pulmonary hypertension.    I reviewed the above mentioned incidental findings with the patient and/or family and they expressed understanding.    Test Results Pending at Discharge (will require follow up):   none     Outpatient Tests Requested:  Ine 3 times weekly until between 2-3    Complications:  none     Reason for Admission: acute pulmonary embolism    Hospital Course:   Coco Ornelas is a 89 y.o. female patient who originally presented to the hospital on 5/20/2024 due to shortness of breath.  Patient requiring supplemental oxygen at time of admission and due to subsegmental pulmonary embolism.  Pulmonary was consulted and assisted with treatment.  Patient was initially on heparin drip and then transition to Coumadin due to high fall risk.  Patient being discharged to short-term rehab on Coumadin and would base Lovenox bridge.  INR is to be monitored every other day until INR between 2 and 3.  Patient weaned off of oxygen at time of discharge.    Please see above list of diagnoses and related plan for additional information.     Condition at Discharge: good    Discharge Day Visit / Exam:   Subjective: Patient states that she is feeling well.  Denies chest pain, shortness of breath or abdominal pain.  Agreeable to discharge plan to short-term rehab at this time.  Vitals: Blood Pressure: 149/78 (05/23/24 0724)  Pulse: 92 (05/23/24 0724)  Temperature: 98.1 °F (36.7 °C) (05/23/24 0724)  Temp Source: Temporal (05/22/24 1117)  Respirations: 18 (05/23/24 0724)  Height: 5' (152.4 cm) (05/21/24 0702)  Weight - Scale: 41.7 kg (92 lb) (05/23/24 0530)  SpO2: 97 % (05/23/24 0900)  Exam:   Physical Exam  Vitals reviewed.   Constitutional:        General: She is not in acute distress.     Appearance: Normal appearance. She is not ill-appearing.   HENT:      Head: Normocephalic and atraumatic.      Nose: Nose normal.      Mouth/Throat:      Mouth: Mucous membranes are moist.      Pharynx: Oropharynx is clear.   Eyes:      Extraocular Movements: Extraocular movements intact.      Conjunctiva/sclera: Conjunctivae normal.   Cardiovascular:      Rate and Rhythm: Normal rate and regular rhythm.      Pulses: Normal pulses.      Heart sounds: Murmur heard.   Pulmonary:      Effort: Pulmonary effort is normal. No respiratory distress.      Breath sounds: Normal breath sounds. No wheezing or rhonchi.   Abdominal:      General: Abdomen is flat. Bowel sounds are normal. There is no distension.      Palpations: Abdomen is soft.      Tenderness: There is no abdominal tenderness. There is no guarding.   Musculoskeletal:         General: Normal range of motion.      Cervical back: Normal range of motion.      Right lower leg: No edema.      Left lower leg: No edema.   Skin:     General: Skin is warm.   Neurological:      General: No focal deficit present.      Mental Status: She is alert and oriented to person, place, and time. Mental status is at baseline.      Motor: No weakness.   Psychiatric:         Mood and Affect: Mood normal.         Behavior: Behavior normal.         Thought Content: Thought content normal.         Judgment: Judgment normal.          Discussion with Family: Updated  (son in law) via phone.    Discharge instructions/Information to patient and family:   See after visit summary for information provided to patient and family.      Provisions for Follow-Up Care:  See after visit summary for information related to follow-up care and any pertinent home health orders.      Mobility at time of Discharge:   Basic Mobility Inpatient Raw Score: 18  JH-HLM Goal: 6: Walk 10 steps or more  JH-HLM Achieved: 7: Walk 25 feet or more  HLM Goal  achieved. Continue to encourage appropriate mobility.     Disposition:   Other Skilled Nursing Facility at [facility]    Planned Readmission: none     Discharge Statement:  I spent 45 minutes discharging the patient. This time was spent on the day of discharge. I had direct contact with the patient on the day of discharge. Greater than 50% of the total time was spent examining patient, answering all patient questions, arranging and discussing plan of care with patient as well as directly providing post-discharge instructions.  Additional time then spent on discharge activities.    Discharge Medications:  See after visit summary for reconciled discharge medications provided to patient and/or family.      **Please Note: This note may have been constructed using a voice recognition system**

## 2024-05-23 NOTE — ASSESSMENT & PLAN NOTE
Presented to ED with shortness of breath for the last 6 weeks but has been worsening recently. Patient now unable to lay flat when sleeping and needed to prop head up on pillows. Recently diagnosed with a heart murmur outpatient.   EKG sinus rhythm HR 98 bpm with PAC and PVCs  D-dimer elevated at 2.54  Chest CTA pe study: Acute segmental and subsegmental pulmonary embolus in both upper lobes with nothing to indicate right heart strain.   LE Duplex US without evidence of DVT  Echo: Left ventricle is normal in size and function. Estimated LVEF 70%. Mild to moderate concentric left ventricular hypertrophy. No regional wall motion abnormality. The inferior vena cava is dilated   Troponin flat    Heparin drip started  Discussion had with son in law at bedside regarding risks vs benefits of AC, understands that bleeding is risk of AC Understands that if patient has cut or falls, should be re-evaluated in the ED.   Started patient on coumadin due to high fall risk - should be on this lifelong due to sedentary lifestyle  Of note, patient is jehovah witness, no blood products if she does have decrease in hgb  Consult pulmonology

## 2024-05-24 NOTE — UTILIZATION REVIEW
NOTIFICATION OF ADMISSION DISCHARGE   This is a Notification of Discharge from [facility]. Please be advised that this patient has been discharge from our facility. Below you will find the admission and discharge date and time including the patient’s disposition.   UTILIZATION REVIEW CONTACT:  Rylie Manzanares  Utilization   Network Utilization Review Department  Phone: [phone] x carefully listen to the prompts. All voicemails are confidential.  Email: [email or web address]     ADMISSION INFORMATION  PRESENTATION DATE: 5/20/2024  2:24 PM  OBERVATION ADMISSION DATE:   INPATIENT ADMISSION DATE: 5/20/24  4:57 PM   DISCHARGE DATE: 5/23/2024  4:39 PM   DISPOSITION:Non [facility] SNF/TCU/SNU    Network Utilization Review Department  ATTENTION: Please call with any questions or concerns to [phone] and carefully listen to the prompts so that you are directed to the right person. All voicemails are confidential.   For Discharge needs, contact Care Management DC Support Team at [phone] opt. 2  Send all requests for admission clinical reviews, approved or denied determinations and any other requests to dedicated fax number below belonging to the campus where the patient is receiving treatment. List of dedicated fax numbers for the Facilities:  FACILITY NAME UR FAX NUMBER   ADMISSION DENIALS (Administrative/Medical Necessity) [phone]   DISCHARGE SUPPORT TEAM ([facility]) [phone]   PARENT CHILD HEALTH (Maternity/NICU/Pediatrics) [phone]   [facility] [phone]   [facility] [phone]   Critical access hospital [phone]   [facility] [phone]   Critical access hospital [phone]   [facility] [phone]   [facility] [phone]   [facility]  [place] [phone]   [facility] [phone]   [facility] [phone]   [facility] [phone]   [facility] [phone]

## 2024-05-25 LAB
BACTERIA BLD CULT: NORMAL
BACTERIA BLD CULT: NORMAL

## 2024-06-14 ENCOUNTER — HOSPITAL ENCOUNTER (EMERGENCY)
Facility: HOSPITAL | Age: 89
Discharge: HOME | End: 2024-06-14
Attending: EMERGENCY MEDICINE
Payer: COMMERCIAL

## 2024-06-14 ENCOUNTER — APPOINTMENT (EMERGENCY)
Dept: RADIOLOGY | Facility: HOSPITAL | Age: 89
End: 2024-06-14
Payer: COMMERCIAL

## 2024-06-14 VITALS
RESPIRATION RATE: 24 BRPM | OXYGEN SATURATION: 95 % | HEIGHT: 60 IN | TEMPERATURE: 98.4 F | WEIGHT: 101.41 LBS | DIASTOLIC BLOOD PRESSURE: 79 MMHG | HEART RATE: 84 BPM | BODY MASS INDEX: 19.91 KG/M2 | SYSTOLIC BLOOD PRESSURE: 176 MMHG

## 2024-06-14 DIAGNOSIS — I50.9 CHF (CONGESTIVE HEART FAILURE) (HCC): ICD-10-CM

## 2024-06-14 DIAGNOSIS — R06.02 SOB (SHORTNESS OF BREATH): Primary | ICD-10-CM

## 2024-06-14 LAB
ALBUMIN SERPL BCP-MCNC: 3.6 G/DL (ref 3.5–5)
ALP SERPL-CCNC: 75 U/L (ref 34–104)
ALT SERPL W P-5'-P-CCNC: 16 U/L (ref 7–52)
ANION GAP SERPL CALCULATED.3IONS-SCNC: 6 MMOL/L (ref 4–13)
APTT PPP: 54 SECONDS (ref 23–37)
AST SERPL W P-5'-P-CCNC: 33 U/L (ref 13–39)
BASOPHILS # BLD AUTO: 0.09 THOUSANDS/ÂΜL (ref 0–0.1)
BASOPHILS NFR BLD AUTO: 1 % (ref 0–1)
BILIRUB SERPL-MCNC: 0.71 MG/DL (ref 0.2–1)
BNP SERPL-MCNC: 580 PG/ML (ref 0–100)
BUN SERPL-MCNC: 18 MG/DL (ref 5–25)
CALCIUM SERPL-MCNC: 9.1 MG/DL (ref 8.4–10.2)
CARDIAC TROPONIN I PNL SERPL HS: 10 NG/L
CHLORIDE SERPL-SCNC: 106 MMOL/L (ref 96–108)
CO2 SERPL-SCNC: 26 MMOL/L (ref 21–32)
CREAT SERPL-MCNC: 0.65 MG/DL (ref 0.6–1.3)
EOSINOPHIL # BLD AUTO: 0.26 THOUSAND/ÂΜL (ref 0–0.61)
EOSINOPHIL NFR BLD AUTO: 4 % (ref 0–6)
ERYTHROCYTE [DISTWIDTH] IN BLOOD BY AUTOMATED COUNT: 16.2 % (ref 11.6–15.1)
FLUAV RNA RESP QL NAA+PROBE: NEGATIVE
FLUBV RNA RESP QL NAA+PROBE: NEGATIVE
GFR SERPL CREATININE-BSD FRML MDRD: 75 ML/MIN/1.73SQ M
GLUCOSE SERPL-MCNC: 93 MG/DL (ref 65–140)
HCT VFR BLD AUTO: 39.2 % (ref 34.8–46.1)
HGB BLD-MCNC: 12 G/DL (ref 11.5–15.4)
IMM GRANULOCYTES # BLD AUTO: 0.04 THOUSAND/UL (ref 0–0.2)
IMM GRANULOCYTES NFR BLD AUTO: 1 % (ref 0–2)
INR PPP: 2.52 (ref 0.84–1.19)
LYMPHOCYTES # BLD AUTO: 1.99 THOUSANDS/ÂΜL (ref 0.6–4.47)
LYMPHOCYTES NFR BLD AUTO: 30 % (ref 14–44)
MCH RBC QN AUTO: 30.2 PG (ref 26.8–34.3)
MCHC RBC AUTO-ENTMCNC: 30.6 G/DL (ref 31.4–37.4)
MCV RBC AUTO: 99 FL (ref 82–98)
MONOCYTES # BLD AUTO: 0.74 THOUSAND/ÂΜL (ref 0.17–1.22)
MONOCYTES NFR BLD AUTO: 11 % (ref 4–12)
NEUTROPHILS # BLD AUTO: 3.5 THOUSANDS/ÂΜL (ref 1.85–7.62)
NEUTS SEG NFR BLD AUTO: 53 % (ref 43–75)
NRBC BLD AUTO-RTO: 0 /100 WBCS
PLATELET # BLD AUTO: 257 THOUSANDS/UL (ref 149–390)
PMV BLD AUTO: 9 FL (ref 8.9–12.7)
POTASSIUM SERPL-SCNC: 4.7 MMOL/L (ref 3.5–5.3)
PROT SERPL-MCNC: 7.4 G/DL (ref 6.4–8.4)
PROTHROMBIN TIME: 27.5 SECONDS (ref 11.6–14.5)
RBC # BLD AUTO: 3.97 MILLION/UL (ref 3.81–5.12)
RSV RNA RESP QL NAA+PROBE: NEGATIVE
SARS-COV-2 RNA RESP QL NAA+PROBE: NEGATIVE
SODIUM SERPL-SCNC: 138 MMOL/L (ref 135–147)
WBC # BLD AUTO: 6.62 THOUSAND/UL (ref 4.31–10.16)

## 2024-06-14 PROCEDURE — 85730 THROMBOPLASTIN TIME PARTIAL: CPT | Performed by: EMERGENCY MEDICINE

## 2024-06-14 PROCEDURE — 0241U HB NFCT DS VIR RESP RNA 4 TRGT: CPT | Performed by: EMERGENCY MEDICINE

## 2024-06-14 PROCEDURE — 36415 COLL VENOUS BLD VENIPUNCTURE: CPT | Performed by: EMERGENCY MEDICINE

## 2024-06-14 PROCEDURE — 93005 ELECTROCARDIOGRAM TRACING: CPT

## 2024-06-14 PROCEDURE — 80053 COMPREHEN METABOLIC PANEL: CPT | Performed by: EMERGENCY MEDICINE

## 2024-06-14 PROCEDURE — 71046 X-RAY EXAM CHEST 2 VIEWS: CPT

## 2024-06-14 PROCEDURE — 85025 COMPLETE CBC W/AUTO DIFF WBC: CPT | Performed by: EMERGENCY MEDICINE

## 2024-06-14 PROCEDURE — 99285 EMERGENCY DEPT VISIT HI MDM: CPT | Performed by: EMERGENCY MEDICINE

## 2024-06-14 PROCEDURE — 85610 PROTHROMBIN TIME: CPT | Performed by: EMERGENCY MEDICINE

## 2024-06-14 PROCEDURE — 83880 ASSAY OF NATRIURETIC PEPTIDE: CPT | Performed by: EMERGENCY MEDICINE

## 2024-06-14 PROCEDURE — 84484 ASSAY OF TROPONIN QUANT: CPT | Performed by: EMERGENCY MEDICINE

## 2024-06-14 RX ORDER — FUROSEMIDE 10 MG/ML
60 INJECTION INTRAMUSCULAR; INTRAVENOUS ONCE
Status: COMPLETED | OUTPATIENT
Start: 2024-06-14 | End: 2024-06-14

## 2024-06-14 RX ORDER — FUROSEMIDE 20 MG/1
20 TABLET ORAL DAILY
Qty: 5 TABLET | Refills: 0 | Status: SHIPPED | OUTPATIENT
Start: 2024-06-14 | End: 2024-06-19

## 2024-06-14 RX ADMIN — FUROSEMIDE 60 MG: 10 INJECTION, SOLUTION INTRAMUSCULAR; INTRAVENOUS at 17:17

## 2024-06-14 NOTE — ED PROVIDER NOTES
History  Chief Complaint   Patient presents with    Shortness of Breath     BIBA for SOB, pt having swelling to b/l legs, currently taking xarelto for PE found during stay on 5/20      Patient is a 89-year-old female presenting to the emergency department accompanied by son-in-law secondary to worsening shortness of breath, states it feels as though she cannot get a deep breath, she is having some swelling in her lower extremities as well, patient denies any cough or congestion, no fever or chills, she denies any chest pain, no abdominal pain, no nausea vomiting or diarrhea, patient recently admitted at this facility and found to have bilateral subsegmental PE, she is currently taking Xarelto        Prior to Admission Medications   Prescriptions Last Dose Informant Patient Reported? Taking?   Bromfenac Sodium, Once-Daily, 0.09 % SOLN   Yes No   Sig: Apply 1 drop to eye 2 (two) times a day Left eye   docusate sodium (COLACE) 100 mg capsule   No No   Sig: Take 1 capsule (100 mg total) by mouth 2 (two) times a day   enoxaparin (LOVENOX) 40 mg/0.4 mL   No No   Sig: Inject 0.4 mL (40 mg total) under the skin in the morning   warfarin (COUMADIN) 5 mg tablet   No No   Sig: Take 1 tablet (5 mg total) by mouth daily      Facility-Administered Medications: None       Past Medical History:   Diagnosis Date    Diarrhea     Glaucoma     Macular degeneration        Past Surgical History:   Procedure Laterality Date    NO PAST SURGERIES         History reviewed. No pertinent family history.  I have reviewed and agree with the history as documented.    E-Cigarette/Vaping    E-Cigarette Use Never User      E-Cigarette/Vaping Substances     Social History     Tobacco Use    Smoking status: Former     Types: Cigarettes    Smokeless tobacco: Never   Vaping Use    Vaping status: Never Used   Substance Use Topics    Alcohol use: Never    Drug use: Never       Review of Systems   Constitutional: Negative.    HENT: Negative.     Eyes:  Negative.    Respiratory:  Positive for shortness of breath.    Cardiovascular:  Positive for leg swelling.   Gastrointestinal: Negative.    Endocrine: Negative.    Genitourinary: Negative.    Musculoskeletal: Negative.    Skin: Negative.    Allergic/Immunologic: Negative.    Neurological: Negative.    Hematological: Negative.    Psychiatric/Behavioral: Negative.         Physical Exam  Physical Exam  Constitutional:       General: She is active.      Appearance: Normal appearance. She is well-developed.   HENT:      Head: Normocephalic and atraumatic.   Eyes:      General: Lids are normal.      Extraocular Movements: EOM normal.      Conjunctiva/sclera: Conjunctivae normal.      Pupils: Pupils are equal, round, and reactive to light.   Neck:      Trachea: Trachea normal.   Cardiovascular:      Rate and Rhythm: Normal rate and regular rhythm.   Pulmonary:      Effort: Pulmonary effort is normal.      Breath sounds: Normal breath sounds.   Abdominal:      General: Bowel sounds are normal.      Palpations: Abdomen is soft.   Musculoskeletal:         General: Normal range of motion.      Cervical back: Normal range of motion and neck supple.      Right lower leg: Edema present.      Left lower leg: Edema present.      Comments: Trace bilateral lower extremity edema   Skin:     General: Skin is warm and dry.      Capillary Refill: Capillary refill takes less than 2 seconds.   Neurological:      Mental Status: She is alert and oriented to person, place, and time.      Cranial Nerves: No cranial nerve deficit.      Sensory: No sensory deficit.   Psychiatric:         Mood and Affect: Mood and affect normal.         Behavior: Behavior normal.         Thought Content: Thought content normal.         Vital Signs  ED Triage Vitals [06/14/24 1526]   Temperature Pulse Respirations Blood Pressure SpO2   98.4 °F (36.9 °C) 104 18 149/84 94 %      Temp Source Heart Rate Source Patient Position - Orthostatic VS BP Location FiO2 (%)    Temporal Monitor Sitting Right arm --      Pain Score       --           Vitals:    06/14/24 1530 06/14/24 1545 06/14/24 1615 06/14/24 1718   BP: 166/77 167/79 151/66 (!) 176/79   Pulse: 89 81 77 84   Patient Position - Orthostatic VS:             Visual Acuity      ED Medications  Medications   furosemide (LASIX) injection 60 mg (60 mg Intravenous Given 6/14/24 1717)       Diagnostic Studies  Results Reviewed       Procedure Component Value Units Date/Time    Comprehensive metabolic panel [[id number]] Collected: 06/14/24 1556    Lab Status: Final result Specimen: Blood from Arm, Right Updated: 06/14/24 1640     Sodium 138 mmol/L      Potassium 4.7 mmol/L      Chloride 106 mmol/L      CO2 26 mmol/L      ANION GAP 6 mmol/L      BUN 18 mg/dL      Creatinine 0.65 mg/dL      Glucose 93 mg/dL      Calcium 9.1 mg/dL      AST 33 U/L      ALT 16 U/L      Alkaline Phosphatase 75 U/L      Total Protein 7.4 g/dL      Albumin 3.6 g/dL      Total Bilirubin 0.71 mg/dL      eGFR 75 ml/min/1.73sq m     Narrative:      National Kidney Disease Foundation guidelines for Chronic Kidney Disease (CKD):     Stage 1 with normal or high GFR (GFR > 90 mL/min/1.73 square meters)    Stage 2 Mild CKD (GFR = 60-89 mL/min/1.73 square meters)    Stage 3A Moderate CKD (GFR = 45-59 mL/min/1.73 square meters)    Stage 3B Moderate CKD (GFR = 30-44 mL/min/1.73 square meters)    Stage 4 Severe CKD (GFR = 15-29 mL/min/1.73 square meters)    Stage 5 End Stage CKD (GFR <15 mL/min/1.73 square meters)  Note: GFR calculation is accurate only with a steady state creatinine    FLU/RSV/COVID - if FLU/RSV clinically relevant [[id number]]  (Normal) Collected: 06/14/24 1556    Lab Status: Final result Specimen: Nares from Nose Updated: 06/14/24 1639     SARS-CoV-2 Negative     INFLUENZA A PCR Negative     INFLUENZA B PCR Negative     RSV PCR Negative    Narrative:      FOR PEDIATRIC PATIENTS - copy/paste COVID Guidelines URL to browser:  [email or web address]    SARS-CoV-2 assay is a Nucleic Acid Amplification assay intended for the  qualitative detection of nucleic acid from SARS-CoV-2 in nasopharyngeal  swabs. Results are for the presumptive identification of SARS-CoV-2 RNA.    Positive results are indicative of infection with SARS-CoV-2, the virus  causing COVID-19, but do not rule out bacterial infection or co-infection  with other viruses. Laboratories within the [place] and its  territories are required to report all positive results to the appropriate  public health authorities. Negative results do not preclude SARS-CoV-2  infection and should not be used as the sole basis for treatment or other  patient management decisions. Negative results must be combined with  clinical observations, patient history, and epidemiological information.  This test has not been FDA cleared or approved.    This test has been authorized by FDA under an Emergency Use Authorization  (EUA). This test is only authorized for the duration of time the  declaration that circumstances exist justifying the authorization of the  emergency use of an in vitro diagnostic tests for detection of SARS-CoV-2  virus and/or diagnosis of COVID-19 infection under section 564(b)(1) of  the Act, 21 U.S.C. 360bbb-3(b)(1), unless the authorization is terminated  or revoked sooner. The test has been validated but independent review by FDA  and CLIA is pending.    Test performed using [employer] GeneXpert: This RT-PCR assay targets N2,  a region unique to SARS-CoV-2. A conserved region in the E-gene was chosen  for pan-Sarbecovirus detection which includes SARS-CoV-2.    According to CMS-2020-01-R, this platform meets the definition of high-throughput technology.    B-Type Natriuretic Peptide(BNP) [[id number]]  (Abnormal) Collected: 06/14/24 1556    Lab Status: Final result Specimen: Blood from Arm, Right Updated: 06/14/24 1636     BNP  580 pg/mL     HS Troponin I 2hr [[id number]]     Lab Status: No result Specimen: Blood     HS Troponin I 4hr [[id number]]     Lab Status: No result Specimen: Blood     HS Troponin 0hr (reflex protocol) [[id number]]  (Normal) Collected: 06/14/24 1556    Lab Status: Final result Specimen: Blood from Arm, Right Updated: 06/14/24 1629     hs TnI 0hr 10 ng/L     Protime-INR [[id number]]  (Abnormal) Collected: 06/14/24 1556    Lab Status: Final result Specimen: Blood from Arm, Right Updated: 06/14/24 1623     Protime 27.5 seconds      INR 2.52    APTT [[id number]]  (Abnormal) Collected: 06/14/24 1556    Lab Status: Final result Specimen: Blood from Arm, Right Updated: 06/14/24 1623     PTT 54 seconds     CBC and differential [[id number]]  (Abnormal) Collected: 06/14/24 1556    Lab Status: Final result Specimen: Blood from Arm, Right Updated: 06/14/24 1604     WBC 6.62 Thousand/uL      RBC 3.97 Million/uL      Hemoglobin 12.0 g/dL      Hematocrit 39.2 %      MCV 99 fL      MCH 30.2 pg      MCHC 30.6 g/dL      RDW 16.2 %      MPV 9.0 fL      Platelets 257 Thousands/uL      nRBC 0 /100 WBCs      Segmented % 53 %      Immature Grans % 1 %      Lymphocytes % 30 %      Monocytes % 11 %      Eosinophils Relative 4 %      Basophils Relative 1 %      Absolute Neutrophils 3.50 Thousands/µL      Absolute Immature Grans 0.04 Thousand/uL      Absolute Lymphocytes 1.99 Thousands/µL      Absolute Monocytes 0.74 Thousand/µL      Eosinophils Absolute 0.26 Thousand/µL      Basophils Absolute 0.09 Thousands/µL                    XR chest 2 views   Final Result by Sheldon Peck MD (06/14 1643)      Interstitial edema with small bilateral pleural effusions.            Workstation performed: [id number]                    Procedures  ECG 12 Lead Documentation Only    Date/Time: 6/14/2024 6:29 PM    Performed by: Urmila Mclaughlin DO  Authorized by: Urmila Mclaughlin DO    Indications / Diagnosis:  Shortness of breath  ECG reviewed by me, the ED  Provider: yes    Patient location:  ED  Previous ECG:     Comparison to cardiac monitor: Yes    Interpretation:     Interpretation: normal    Rate:     ECG rate:  96    ECG rate assessment: normal    Rhythm:     Rhythm: sinus rhythm    Ectopy:     Ectopy: none    QRS:     QRS intervals:  Normal  Conduction:     Conduction: normal    ST segments:     ST segments:  Normal  T waves:     T waves: normal             ED Course                               SBIRT 20yo+      Flowsheet Row Most Recent Value   Initial Alcohol Screen:  AUDIT-C     1. How often do you have a drink containing alcohol? 0 Filed at: 06/14/2024 1524   2. How many drinks containing alcohol do you have on a typical day you are drinking?  0 Filed at: 06/14/2024 1524   3b. FEMALE Any Age, or MALE 65+: How often do you have 4 or more drinks on one occassion? 0 Filed at: 06/14/2024 1524   Audit-C Score 0 Filed at: 06/14/2024 1524   RENE: How many times in the past year have you...    Used an illegal drug or used a prescription medication for non-medical reasons? Never Filed at: 06/14/2024 1524                      Medical Decision Making  Patient is an 89-year-old female presenting for worsening shortness of breath with likely acute mild congestive heart failure causing slight volume overload and pulmonary edema without hypoxia.  Alternate diagnoses considered include ACS, valvular disease, arrhythmia, myocarditis/endocarditis, dissection.  EKG shows no signs of acute ischemia, patient denies any chest pain, no evidence of infectious process such as pneumonia, covid, or viral infection.  The patient was given Lasix.  Possibility of admission was discussed with patient and son-in-law at bedside but patient would prefer to go home, since vital signs are stable and she appears in no acute distress plan to discharge with prescription for small dose of Lasix daily for the next 5 days and close follow-up with PCP or return if symptoms worsen    Problems  Addressed:  CHF (congestive heart failure) (Formerly [facility]): acute illness or injury  SOB (shortness of breath): acute illness or injury    Amount and/or Complexity of Data Reviewed  Labs: ordered. Decision-making details documented in ED Course.  Radiology: ordered and independent interpretation performed. Decision-making details documented in ED Course.  ECG/medicine tests: ordered and independent interpretation performed. Decision-making details documented in ED Course.    Risk  Prescription drug management.  Decision regarding hospitalization.             Disposition  Final diagnoses:   SOB (shortness of breath)   CHF (congestive heart failure) (Formerly [facility])     Time reflects when diagnosis was documented in both MDM as applicable and the Disposition within this note       Time User Action Codes Description Comment    6/14/2024  5:19 PM Urmila Mclaughlin Add [R06.02] SOB (shortness of breath)     6/14/2024  5:19 PM Urmila Mclaughlin Add [I50.9] CHF (congestive heart failure) (Formerly [facility])           ED Disposition       ED Disposition   Discharge    Condition   Stable    Date/Time   Fri Jun 14, 2024 3959    Comment   Coco Ornelas discharge to home/self care.                   Follow-up Information    None         Discharge Medication List as of 6/14/2024  5:20 PM        START taking these medications    Details   furosemide (LASIX) 20 mg tablet Take 1 tablet (20 mg total) by mouth daily for 5 days, Starting Fri 6/14/2024, Until Wed 6/19/2024, Normal           CONTINUE these medications which have NOT CHANGED    Details   Bromfenac Sodium, Once-Daily, 0.09 % SOLN Apply 1 drop to eye 2 (two) times a day Left eye, Historical Med      docusate sodium (COLACE) 100 mg capsule Take 1 capsule (100 mg total) by mouth 2 (two) times a day, Starting u 5/23/2024, No Print      enoxaparin (LOVENOX) 40 mg/0.4 mL Inject 0.4 mL (40 mg total) under the skin in the morning, Starting Thu 5/23/2024, No Print      warfarin (COUMADIN) 5 mg tablet Take 1 tablet (5 mg  total) by mouth daily, Starting Thu 5/23/2024, No Print             No discharge procedures on file.    PDMP Review       None            ED Provider  Electronically Signed by             Urmila Mclaughlin DO  06/14/24 8602

## 2024-06-16 LAB
ATRIAL RATE: 96 BPM
P AXIS: 86 DEGREES
PR INTERVAL: 172 MS
QRS AXIS: 25 DEGREES
QRSD INTERVAL: 88 MS
QT INTERVAL: 364 MS
QTC INTERVAL: 459 MS
T WAVE AXIS: 70 DEGREES
VENTRICULAR RATE: 96 BPM

## 2024-06-16 PROCEDURE — 93010 ELECTROCARDIOGRAM REPORT: CPT | Performed by: INTERNAL MEDICINE

## 2025-08-13 ENCOUNTER — HOSPITAL ENCOUNTER (EMERGENCY)
Facility: HOSPITAL | Age: OVER 89
End: 2025-08-13
Attending: EMERGENCY MEDICINE
Payer: COMMERCIAL

## 2025-08-13 ENCOUNTER — APPOINTMENT (EMERGENCY)
Dept: CT IMAGING | Facility: HOSPITAL | Age: OVER 89
End: 2025-08-13
Payer: COMMERCIAL

## 2025-08-13 ENCOUNTER — APPOINTMENT (EMERGENCY)
Dept: RADIOLOGY | Facility: HOSPITAL | Age: OVER 89
End: 2025-08-13
Payer: COMMERCIAL

## 2025-08-13 PROBLEM — I60.9 SAH (SUBARACHNOID HEMORRHAGE) (HCC): Status: ACTIVE | Noted: 2025-08-13
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
## 2025-08-13 PROBLEM — S06.5XAA SDH (SUBDURAL HEMATOMA) (HCC): Status: ACTIVE | Noted: 2025-08-13

## 2025-08-13 PROBLEM — I26.99 PULMONARY EMBOLISM (HCC): Status: ACTIVE | Noted: 2025-08-13

## 2025-08-13 PROBLEM — I61.5 IVH (INTRAVENTRICULAR HEMORRHAGE) (HCC): Status: ACTIVE | Noted: 2025-08-13

## 2025-08-14 ENCOUNTER — TELEPHONE (OUTPATIENT)
Dept: NEUROSURGERY | Facility: CLINIC | Age: OVER 89
End: 2025-08-14

## 2025-08-15 PROBLEM — Z51.5 PALLIATIVE CARE ENCOUNTER: Status: ACTIVE | Noted: 2025-08-15

## 2025-08-16 PROBLEM — G93.40 ACUTE ENCEPHALOPATHY: Status: ACTIVE | Noted: 2025-08-16

## 2025-08-16 PROBLEM — N39.0 UTI (URINARY TRACT INFECTION): Status: ACTIVE | Noted: 2025-08-16

## 2025-08-17 PROBLEM — S06.36AA TRAUMATIC INTRACEREBRAL HEMORRHAGE (HCC): Status: ACTIVE | Noted: 2025-08-17

## 2025-08-17 PROBLEM — N39.0 UTI (URINARY TRACT INFECTION): Status: RESOLVED | Noted: 2025-08-16 | Resolved: 2025-08-17

## 2025-08-17 PROBLEM — I10 HTN (HYPERTENSION): Status: ACTIVE | Noted: 2025-08-17

## 2025-08-17 PROBLEM — I26.99 PULMONARY EMBOLISM (HCC): Chronic | Status: ACTIVE | Noted: 2025-08-13

## 2025-08-17 PROBLEM — I26.99 ACUTE PULMONARY EMBOLISM (HCC): Status: RESOLVED | Noted: 2024-05-20 | Resolved: 2025-08-17

## 2025-08-20 PROBLEM — R63.0 POOR APPETITE: Status: ACTIVE | Noted: 2025-08-20

## 2025-08-22 PROBLEM — R52 PAIN: Status: ACTIVE | Noted: 2025-08-22
